# Patient Record
Sex: MALE | Race: WHITE
[De-identification: names, ages, dates, MRNs, and addresses within clinical notes are randomized per-mention and may not be internally consistent; named-entity substitution may affect disease eponyms.]

---

## 2018-03-22 ENCOUNTER — HOSPITAL ENCOUNTER (OUTPATIENT)
Dept: HOSPITAL 92 - BICMRI | Age: 73
Discharge: HOME | End: 2018-03-22
Attending: NEUROLOGICAL SURGERY
Payer: MEDICARE

## 2018-03-22 DIAGNOSIS — R60.0: ICD-10-CM

## 2018-03-22 DIAGNOSIS — M47.892: ICD-10-CM

## 2018-03-22 DIAGNOSIS — M54.2: Primary | ICD-10-CM

## 2018-03-22 PROCEDURE — 72156 MRI NECK SPINE W/O & W/DYE: CPT

## 2018-04-05 ENCOUNTER — HOSPITAL ENCOUNTER (OUTPATIENT)
Dept: HOSPITAL 92 - RAD | Age: 73
Discharge: HOME | End: 2018-04-05
Attending: ANESTHESIOLOGY
Payer: MEDICARE

## 2018-04-05 DIAGNOSIS — S13.150A: ICD-10-CM

## 2018-04-05 DIAGNOSIS — M47.812: Primary | ICD-10-CM

## 2018-04-05 PROCEDURE — 72050 X-RAY EXAM NECK SPINE 4/5VWS: CPT

## 2018-10-01 ENCOUNTER — HOSPITAL ENCOUNTER (INPATIENT)
Dept: HOSPITAL 92 - ERS | Age: 73
LOS: 6 days | Discharge: HOME | DRG: 189 | End: 2018-10-07
Attending: FAMILY MEDICINE | Admitting: FAMILY MEDICINE
Payer: MEDICARE

## 2018-10-01 VITALS — BODY MASS INDEX: 25.9 KG/M2

## 2018-10-01 DIAGNOSIS — J96.01: Primary | ICD-10-CM

## 2018-10-01 DIAGNOSIS — C79.70: ICD-10-CM

## 2018-10-01 DIAGNOSIS — E03.9: ICD-10-CM

## 2018-10-01 DIAGNOSIS — R65.10: ICD-10-CM

## 2018-10-01 DIAGNOSIS — R64: ICD-10-CM

## 2018-10-01 DIAGNOSIS — F41.9: ICD-10-CM

## 2018-10-01 DIAGNOSIS — C34.90: ICD-10-CM

## 2018-10-01 DIAGNOSIS — E83.39: ICD-10-CM

## 2018-10-01 DIAGNOSIS — C79.51: ICD-10-CM

## 2018-10-01 DIAGNOSIS — E78.5: ICD-10-CM

## 2018-10-01 DIAGNOSIS — C77.9: ICD-10-CM

## 2018-10-01 DIAGNOSIS — J38.01: ICD-10-CM

## 2018-10-01 DIAGNOSIS — R13.12: ICD-10-CM

## 2018-10-01 DIAGNOSIS — Z91.81: ICD-10-CM

## 2018-10-01 DIAGNOSIS — J44.1: ICD-10-CM

## 2018-10-01 DIAGNOSIS — Z85.818: ICD-10-CM

## 2018-10-01 DIAGNOSIS — E44.0: ICD-10-CM

## 2018-10-01 DIAGNOSIS — Z87.891: ICD-10-CM

## 2018-10-01 DIAGNOSIS — C79.89: ICD-10-CM

## 2018-10-01 DIAGNOSIS — I10: ICD-10-CM

## 2018-10-01 DIAGNOSIS — Z92.3: ICD-10-CM

## 2018-10-01 DIAGNOSIS — E87.6: ICD-10-CM

## 2018-10-01 DIAGNOSIS — R59.9: ICD-10-CM

## 2018-10-01 LAB
ALBUMIN SERPL BCG-MCNC: 3.9 G/DL (ref 3.4–4.8)
ALP SERPL-CCNC: 69 U/L (ref 40–150)
ALT SERPL W P-5'-P-CCNC: 64 U/L (ref 8–55)
ANION GAP SERPL CALC-SCNC: 12 MMOL/L (ref 10–20)
APTT PPP: 28.8 SEC (ref 22.9–36.1)
AST SERPL-CCNC: 26 U/L (ref 5–34)
BILIRUB SERPL-MCNC: 1.3 MG/DL (ref 0.2–1.2)
BUN SERPL-MCNC: 15 MG/DL (ref 8.4–25.7)
CALCIUM SERPL-MCNC: 9.6 MG/DL (ref 7.8–10.44)
CHLORIDE SERPL-SCNC: 94 MMOL/L (ref 98–107)
CO2 SERPL-SCNC: 31 MMOL/L (ref 23–31)
CREAT CL PREDICTED SERPL C-G-VRATE: 0 ML/MIN (ref 70–130)
D DIMER PPP FEU-MCNC: 0.29 *MCG/ML (ref 0.27–0.43)
GLOBULIN SER CALC-MCNC: 2.8 G/DL (ref 2.4–3.5)
GLUCOSE SERPL-MCNC: 114 MG/DL (ref 83–110)
HGB BLD-MCNC: 15.6 G/DL (ref 14–18)
INR PPP: 0.9
MCH RBC QN AUTO: 31.7 PG (ref 27–31)
MCV RBC AUTO: 101 FL (ref 78–98)
MDIFF COMPLETE?: YES
PLATELET # BLD AUTO: 374 THOU/UL (ref 130–400)
PLATELET BLD QL SMEAR: (no result)
POTASSIUM SERPL-SCNC: 3.6 MMOL/L (ref 3.5–5.1)
PROTHROMBIN TIME: 12 SEC (ref 12–14.7)
RBC # BLD AUTO: 4.92 MILL/UL (ref 4.7–6.1)
SODIUM SERPL-SCNC: 133 MMOL/L (ref 136–145)
SP GR UR STRIP: 1.04 (ref 1–1.04)
WBC # BLD AUTO: 17.1 THOU/UL (ref 4.8–10.8)

## 2018-10-01 PROCEDURE — 85730 THROMBOPLASTIN TIME PARTIAL: CPT

## 2018-10-01 PROCEDURE — 83880 ASSAY OF NATRIURETIC PEPTIDE: CPT

## 2018-10-01 PROCEDURE — 83605 ASSAY OF LACTIC ACID: CPT

## 2018-10-01 PROCEDURE — 96377 APPLICATON ON-BODY INJECTOR: CPT

## 2018-10-01 PROCEDURE — 94664 DEMO&/EVAL PT USE INHALER: CPT

## 2018-10-01 PROCEDURE — 84100 ASSAY OF PHOSPHORUS: CPT

## 2018-10-01 PROCEDURE — 93005 ELECTROCARDIOGRAM TRACING: CPT

## 2018-10-01 PROCEDURE — 84145 PROCALCITONIN (PCT): CPT

## 2018-10-01 PROCEDURE — 94640 AIRWAY INHALATION TREATMENT: CPT

## 2018-10-01 PROCEDURE — 70553 MRI BRAIN STEM W/O & W/DYE: CPT

## 2018-10-01 PROCEDURE — 85610 PROTHROMBIN TIME: CPT

## 2018-10-01 PROCEDURE — 96361 HYDRATE IV INFUSION ADD-ON: CPT

## 2018-10-01 PROCEDURE — C1788 PORT, INDWELLING, IMP: HCPCS

## 2018-10-01 PROCEDURE — 71045 X-RAY EXAM CHEST 1 VIEW: CPT

## 2018-10-01 PROCEDURE — 96365 THER/PROPH/DIAG IV INF INIT: CPT

## 2018-10-01 PROCEDURE — 3E03305 INTRODUCTION OF OTHER ANTINEOPLASTIC INTO PERIPHERAL VEIN, PERCUTANEOUS APPROACH: ICD-10-PCS

## 2018-10-01 PROCEDURE — 80048 BASIC METABOLIC PNL TOTAL CA: CPT

## 2018-10-01 PROCEDURE — 87040 BLOOD CULTURE FOR BACTERIA: CPT

## 2018-10-01 PROCEDURE — 80053 COMPREHEN METABOLIC PANEL: CPT

## 2018-10-01 PROCEDURE — 85025 COMPLETE CBC W/AUTO DIFF WBC: CPT

## 2018-10-01 PROCEDURE — S0028 INJECTION, FAMOTIDINE, 20 MG: HCPCS

## 2018-10-01 PROCEDURE — 81003 URINALYSIS AUTO W/O SCOPE: CPT

## 2018-10-01 PROCEDURE — 85379 FIBRIN DEGRADATION QUANT: CPT

## 2018-10-01 PROCEDURE — 71275 CT ANGIOGRAPHY CHEST: CPT

## 2018-10-01 PROCEDURE — 36415 COLL VENOUS BLD VENIPUNCTURE: CPT

## 2018-10-01 PROCEDURE — 83735 ASSAY OF MAGNESIUM: CPT

## 2018-10-01 PROCEDURE — 87086 URINE CULTURE/COLONY COUNT: CPT

## 2018-10-01 PROCEDURE — A4216 STERILE WATER/SALINE, 10 ML: HCPCS

## 2018-10-01 PROCEDURE — 94760 N-INVAS EAR/PLS OXIMETRY 1: CPT

## 2018-10-01 PROCEDURE — 80202 ASSAY OF VANCOMYCIN: CPT

## 2018-10-01 RX ADMIN — VANCOMYCIN HYDROCHLORIDE SCH MLS: 1 INJECTION, POWDER, LYOPHILIZED, FOR SOLUTION INTRAVENOUS at 23:04

## 2018-10-01 RX ADMIN — MOMETASONE FUROATE AND FORMOTEROL FUMARATE DIHYDRATE SCH PUFF: 200; 5 AEROSOL RESPIRATORY (INHALATION) at 20:08

## 2018-10-01 RX ADMIN — FAMOTIDINE SCH MG: 10 INJECTION, SOLUTION INTRAVENOUS at 20:09

## 2018-10-01 NOTE — RAD
ONE VIEW CHEST:

 

History: Dyspnea. Recent diagnosis of lung cancer. 

 

FINDINGS: 

Atherosclerosis of the aorta. Normal cardiac silhouette. The pulmonary vessels and hilum are normal. 
Costophrenic angles are clear. Lungs are hyperinflated. Interstitial opacities of the lung bases are 
noted. There appears to be a nodule in the left upper lobe, measuring 1.3 cm. 

 

There is bilateral apical pleural thickening. No pneumothorax. 

 

IMPRESSION: 

1. Hyperinflation. 

2. Chronic change of the lung parenchyma. 

3. Left upper lobe nodule.

 

POS: [facility]

## 2018-10-01 NOTE — CT
CTA OF THORAX UTILIZING IV CONTRAST AND 3D REFORMATTED IMAGING:

 

Date:  10/01/18 

 

INDICATION:

Shortness of breath with a history of cancer. Patient has a history of lung cancer diagnosed 2 weeks 
ago with 30 lb. weight loss, and increasing weakness and falls. 

 

TECHNIQUE:  

Multiple CTA images were obtained of the thorax utilizing PE protocol. 3D reformatted images were con
structed from the raw data. Motion artifact slightly limits image detail. 

 

FINDINGS:

There is air space consolidation within both lower lobes, with debris seen within the bronchi of both
 lower lobes suspicious for aspiration. There are patchy areas of ground-glass and reticulonodular op
acity within the lateral segment of the right middle lobe suspicious for aspiration. 

 

There is a peripheral nodular opacity within the right upper lobe measuring 8.0 mm on image 51 of ser
ies 3. There is a 2.5 cm pulmonary nodule within the apical posterior segment of the left upper lobe 
on image 33 of series 3. There is pleural parenchymal scarring and scattered emphysema involving both
 lungs. There is a small, 5.0 mm, pulmonary nodule of right upper lobe apical segment on image 22 of 
series 3. Smaller, sub 4.0 mm, pulmonary nodules within the anterior segment of the right upper lobe,
 image 44 of series 3. 

 

There is an enlarged lymphnode within the left AP window measuring 4.5 cm, image 45, series 2. There 
is a left suprahilar nodular prominence on image 49 of series 2 measuring 1.9 cm, which may reflect a
 suprahilar lymph node versus suprahilar mass. 

 

There are enlarged lymph nodes within the left axillary region, the largest measuring 2.4 cm on image
 38 of series 2. 

 

There is ectasia of the ascending aorta up to 3.9 cm. There is aneurysmal dilatation of the aortic ar
ch measuring 3.1 cm. The descending thoracic aorta also measures 3.2 cm. There are scattered coronary
 artery and thoracic aortic calcifications. 

 

Motion artifact slightly limits image detail. No definite central pulmonary embolus is evident. The s
egmental pulmonary arteries are not well seen. There is a mass within the left adrenal gland measurin
g 3.7 cm. 

 

No definite destructive osteolytic or osteoblastic lesion is identified. 

 

IMPRESSION: 

1.  No definite central pulmonary embolus demonstrated within limitations of exam. Lobar and segmenta
l pulmonary branches are not well seen due to motion artifact. 

 

2.  Findings suspicious for aspiration pneumonitis of both lower lobes and right middle lobe. 

 

3.  Scattered pulmonary nodules within both lungs suspicious for metastatic disease. 

 

4.  Nodular opacity within the left suprahilar region may reflect an enlarged lymph node versus supra
hilar mass. 

 

5.  Large lymph node within the AP window of the mediastinum suspicious for malignant lymphadenopathy
. 

 

6.  Enlarged lymph nodes left axillary region suspicious for metastatic disease. 

 

7.  Left adrenal gland mass suspicious for metastatic disease. 

 

 

 

 

 

POS: [facility]

## 2018-10-01 NOTE — PDOC.FPRHP
- History of Present Illness


Chief Complaint: Worsening SOB


History of Present Illness: 





This is a 74 yo male PMH of Hypothyroidism 2/2 radiation for christiano/throat cancer

, HLD, recent diagnosis of Lung cancer (2wk ago), HTN who presents to the ED 

with a CC of worsening SOB. He and his wife report he has been havin increased 

dyspea in the last 2 weeks. He reports a productive cough with thick yellow 

sputum. Pt. reports that in the last 6 weeks, he as been on 3 different abx 

regiments. One was levoquin and he had a negative reaction to it. Wife is 

unsure of the other two. Pt was also started a steroid step down on 9/19-10/4. 

He is currently on 1/2 pill, unknown dose. Pt. has no history of COPD or 

history of dyspnea. 





Pt. has been having dysphagia x 1 month. He is having trouble swallowing water. 

He reports it gets stuck up in his throat. 





Pt. had fall on 2/18





Pt. was diagnosed with Throat and tongue cancer in 2003. He received Radiation 

and surgery. Radiation therapy caused iatrogenic hypothyroidism and cataracts. 

Pt. had scope by Dr. Hatfield on 9/18 and he was clear of any cancer. 





Pt. had PET scan and was diagnosed with lung cancer 2 weeks ago. 9/28 Pt. had 

axilarly lymph node biopsy. Pending results. Dr. Yuen is his pulmonologist. 





- Allergies/Adverse Reactions


 Allergies











Allergy/AdvReac Type Severity Reaction Status Date / Time


 


No Known Allergies Allergy   Unverified 10/01/18 14:40














- Home Medications


 











 Medication  Instructions  Recorded  Confirmed  Type


 


Amlodipine [Norvasc] 10 mg PO DAILY 10/01/18 10/01/18 History


 


Levothyroxine Sodium [Synthroid] 137 mcg PO DAILY 10/01/18 10/01/18 History


 


Losartan/Hydrochlorothiazide 1 tablet PO DAILY 10/01/18 10/01/18 History





[Losartan-Hctz 100-12.5 mg Tab]    














- History


PMHx: Hypothyroidism, HLD, metastatic lung cancer, throat/tongue cancer in 

remission, HTN


 


PSHx: Throat/tongue cancer, 





FHx: none


 


Social: Denies D/A/T


 








- Review of Systems


General: reports: weight/appetite/sleep changes (Decreased oral intake).  denies

: fever/chills


Eyes: denies: eye pain, vision changes


ENT: denies: nasal congestion, rhinorrhea


Respiratory: reports: cough (yellow sputum), shortness of breath, exercise 

intolerance.  denies: congestion


Cardiovascular: reports: other (rib pain from coughing).  denies: chest pain, 

palpitation, edema


Gastrointestinal: denies: nausea, vomiting, diarrhea, constipation, abdominal 

pain


Genitourinary: denies: incontinence, dysuria


Skin: denies: rashes, lesions


Musculoskeletal: denies: pain, tenderness


Neurological: denies: numbness, syncope





- Vital signs


BP: 145/95  HR: 103 RR: 24 Tmax: 97.2 Pox: 93% on 4L  Wt: 77.11   








- Physical Exam


Constitutional: awake, alert and oriented


-Constitutional: 





Appears in mild distress 2/2 respiratory distress.


HEENT: normocephalic and atraumatic, PERRLA, EOMI, conjunctiva clear


Neck: supple, FROM


Chest: no-tender to palpation


Heart: RRR, normal S1/S2, no murmurs/rubs/gallops


-Lungs: 





Pt. has diffuse coarse breath sounds. Pt. also has radiation from upper airway


Abdomen: soft, non-tender, bowel sounds present


Musculoskeletal: ROM grossly normal


Neurological: CN II-XII intact


-Skin: 





Cap refill ~4 seconds


Psychiatric: normal mood and affect, good judgment and insight





FMR H&P: Results





- Labs


Result Diagrams: 


 10/02/18 04:23





 10/02/18 04:23


Lab results: 


 











WBC  17.1 thou/uL (4.8-10.8)  H  10/01/18  10:32    


 


Hgb  15.6 g/dL (14.0-18.0)   10/01/18  10:32    


 


Hct  49.8 % (42.0-52.0)   10/01/18  10:32    


 


MCV  101.0 fL (78.0-98.0)  H  10/01/18  10:32    


 


Plt Count  374 thou/uL (130-400)   10/01/18  10:32    


 


Band Neuts % (Manual)  25 % (5-11)  H  10/01/18  10:32    


 


Sodium  133 mmol/L (136-145)  L  10/01/18  10:32    


 


Potassium  3.6 mmol/L (3.5-5.1)   10/01/18  10:32    


 


Chloride  94 mmol/L ()  L  10/01/18  10:32    


 


Carbon Dioxide  31 mmol/L (23-31)   10/01/18  10:32    


 


BUN  15 mg/dL (8.4-25.7)   10/01/18  10:32    


 


Creatinine  0.75 mg/dL (0.6-1.3)   10/01/18  10:32    


 


Glucose  114 mg/dL ()  H  10/01/18  10:32    


 


Lactic Acid  1.5 mmol/L (0.5-2.2)   10/01/18  10:32    


 


Calcium  9.6 mg/dL (7.8-10.44)   10/01/18  10:32    


 


Total Bilirubin  1.3 mg/dL (0.2-1.2)  H  10/01/18  10:32    


 


AST  26 U/L (5-34)   10/01/18  10:32    


 


ALT  64 U/L (8-55)  H  10/01/18  10:32    


 


Alkaline Phosphatase  69 U/L ()   10/01/18  10:32    


 


Serum Total Protein  6.7 g/dL (5.8-8.1)   10/01/18  10:32    


 


Albumin  3.9 g/dL (3.4-4.8)   10/01/18  10:32    














- Radiology Interpretation


  ** Chest x-ray


Status: image reviewed by me, report reviewed by me (Hyperinflation, chronic 

change of the lung parenchyma, left upper lobe nodule)





FMR H&P: A/P





- Problem List


(1) Dyspnea


Current Visit: Yes   Status: Acute   Code(s): R06.00 - DYSPNEA, UNSPECIFIED   





(2) Lung cancer


Current Visit: Yes   Status: Acute   Code(s): C34.90 - MALIGNANT NEOPLASM OF 

UNSP PART OF UNSP BRONCHUS OR LUNG   





(3) HTN (hypertension)


Current Visit: Yes   Status: Acute   Code(s): I10 - ESSENTIAL (PRIMARY) 

HYPERTENSION   





(4) HLD (hyperlipidemia)


Current Visit: Yes   Status: Acute   Code(s): E78.5 - HYPERLIPIDEMIA, 

UNSPECIFIED   





(5) Hypothyroidism


Current Visit: Yes   Status: Acute   Code(s): E03.9 - HYPOTHYROIDISM, 

UNSPECIFIED   





(6) History of throat cancer


Current Visit: Yes   Status: Acute   Code(s): Z85.819 - PRSNL HX OF MALIG 

NEOPLM OF UNSP SITE LIP,ORAL CAV,& PHARYNX   





(7) Tongue cancer


Current Visit: Yes   Status: Acute   





(8) History of tobacco abuse


Current Visit: Yes   Status: Acute   Code(s): Z87.891 - PERSONAL HISTORY OF 

NICOTINE DEPENDENCE   





- Plan





This is a 74 yo male PMH of Hypothyroidism 2/2 radiation for tongue/throat 

cancer, HLD, recent diagnosis of Lung cancer (2wk ago), HTN








Dyspnea PNA vs. COPD exacerbation


-CXR shows no signs of PNA, procalcitonin 31.79. Pt. was started on vanc and 

zosyn (10/1) in the ED and will be continued. He is also receiving PRN duonebs 

and PRN albuterol nebs. Pt has been started on 10mg dexamethasone (10/1). D-

dimer was negative. CBC was 17.1 although patient has been on PO steroids for 

dyspnea since 9/19.





Lung cancer


-Diagnoses with PET scan 2 weeks ago. Pt. had a lymph node biopsy on 9/28. Dr. Yuen is his pulmonologist. 





Hypothyroidism


-2/2 radiation. Will continue home meds once reconciled.





HTN


-Continue pt's home amlodipine and losartan/HCTZ





HLD


-Start pt. on statin





Hx of throat/tongue cancer


-Pt. has been on pureed diet since surgery in 2003. Pt. had recent, 9/18, scope 

performed by Dr. Rodrick Hatfield showing no signs of cancer. Despite this he as 

had increased dysphagia to water only. Speech has been consulted. Pt. is on 

clear liquid diet.





Hx of fall


-No current risk of fall. Was 2/2 poor judgment





Hx of tobacco abuse


-30 pack year history, quit 10 years ago











Code: full


Prophylaxis: Pepcid, lovenox


Family: Wife at bedside, plan discussed with her.


Disposition: Admit to oncology, [place] in 3-4 days





FMR H&P: Upper Level





- Pertinent history





HPI: 74 yo M with a PMHx HTN and head and neck cancer who presents with 

worsening shortness of breath, dysphagia and chest congestion for the last 3 

weeks. He reports cough with increased sputum production and that he has been 

on multiple treatment regimens of abx and steroids OP without any improvement. 

He reports that his BP has also been increasingly difficult to control over the 

last few weeks as well. He has had poor PO intake and difficulty swallowing his 

pureed diet and now some difficulty with water. His PCP is in [place]. 








- Pertinent findings





Exam: VS as above - mildly hypertensive, tachycardic and tachypenic


Gen: alert and oriented x3


HEENT: EOMI, conjunctiva non-injected, oropharynx without posterior erythema or 

exudates, tongue resected


CV: tachycardic, distant heart sounds hard to auscultate over breath sounds


RESP: High-pitched diffuse coarse worse in upper lobes with good air entry BL


Abd: soft, nontender, nondistended


Ext: BLE without edema, pedal pulses 1+, radial pulses 1+


Neuro: No facial droop, equal movements in all extremities, speech slightly 

difficult to understand


Skin: bruising under L axilla at site of LN biopsy





CXR: Increased lung fields, L lung nodule, chronic changes





Labs: WBC 17.1,  Hgb 15.6, hct 49.8, Plt 374, , Coags WNL, D dimber 0.29

, Na 133, K 3.6, CO2 94, Cr 075, Glu 114, AST 26, ALT 64, Alk phos 69, BNP 29.8

, Procal 31.79





A/P: 74 yo M with PMHx HTN and head and neck cancer with recent worsening SOB 

and dysphagia here with SIRS 2/2 suspected PNA


1. SIRS: s/p 1L NS in ED. Will continue fluids. Vanc and Zosyn. BCx not drawn 

until after abs started. Lactic acid negative. Procal elevated at 31.79.


2. SOB: No clear consolidation on CXR. CTA pending. Satting 93% on 4L NC. 

Reports SOB was 5/10 on admission and now 2.5 in difficulty. Will start steroids

, continue abx and schedule duonebs.


3. Dysphagia: Acutely worsening over the last couple of weeks. SLP consult


4. HTN:  Slightly elevated. Start home meds. Amlodipine and Losartan/hctz. 

Consider augmentation.


5. Recent LN biopsy with likely metastatic disease: Will await path. No 

oncologist as of yet. Will consult if indicated inpatient, otherwise will need 

close f/u outpatient.


6. Hypothyroid: Continue home Synthroid


7. H/o head and neck cancer


8. H/o tobacco abuse: Quit 10 years ago





PPX: Lovenox





CODE: FULL








- Plan


Date/Time: 10/01/18 1202








I, Naomi Min MD, PGY-3, have evaluated this patient and agree with findings/

plan as outlined by intern resident. Pertinent changes/additions are listed 

here.








Attending Addendum





- Attending Addendum


Date/Time: 10/01/18 1352





I personally evaluated the patient and discussed the management with Dr. Thorne and Dr. Min


I agree with the History, Examination, Assessment and Plan documented above 

with any addition or exceptions noted below.





74 yo male with past history of head and neck cancer presents to ER for 

evaluation for SOB.


Patient reports recent dx of primary lung cancer with possible mets to 

adrenals. Reports chronic worsening cough with increased sputum production over 

the past month. Has been treated with 3 rounds of antibiotics and steroid 

taper. Today not able to ambulate easily at home due to SILVA which is far from 

baseline. s/p lymph node biopsy on Friday. No fever or chills. No known sick 

contacts. No other symptoms. 


VS reviewed. 


Images reviewed. 


Labs reviewed. 


Ill appearing on exam. Tachycardic on exam. No murmurs. Course breath sounds 

throughout felt to be complicated by radiation of upper airway noise. 





SIRS concern for sepsis: Procal 31. Cultures obtained after antibiotics given 

in ER. Emperic antibiotics for now. Continue IVFs. Monitor closely. (HR, RR, 

WBCs, procal)


Hypoxic respiratory distress: On supplemental O2. O2 sat maintained on NC. 

Patient reports feeling much better. CXR reviewed. CTA abd ddimer negative for 

PE. Also patient has 2 medical records in system with previous CTAs noted just 

recently. Not known during time of CTA order. Working on merging charts. 


Head/Neck cancer: s/p treatment/surgery 2003. Now with dysphagia. Speech eval 

for swallow. 


Metastatic lung cancer: Awaiting complete diagnosis and workup. Yuen to be 

consulted. 


COPD: Schedule breathing treatments and steroids. Adjust meds as needed. 





José

## 2018-10-02 LAB
ANION GAP SERPL CALC-SCNC: 11 MMOL/L (ref 10–20)
BASOPHILS # BLD AUTO: 0 THOU/UL (ref 0–0.2)
BASOPHILS NFR BLD AUTO: 0 % (ref 0–1)
BUN SERPL-MCNC: 11 MG/DL (ref 8.4–25.7)
CALCIUM SERPL-MCNC: 9 MG/DL (ref 7.8–10.44)
CHLORIDE SERPL-SCNC: 97 MMOL/L (ref 98–107)
CO2 SERPL-SCNC: 31 MMOL/L (ref 23–31)
CREAT CL PREDICTED SERPL C-G-VRATE: 91 ML/MIN (ref 70–130)
EOSINOPHIL # BLD AUTO: 0 THOU/UL (ref 0–0.7)
EOSINOPHIL NFR BLD AUTO: 0.2 % (ref 0–10)
GLUCOSE SERPL-MCNC: 137 MG/DL (ref 83–110)
HGB BLD-MCNC: 14.1 G/DL (ref 14–18)
LYMPHOCYTES # BLD: 0.5 THOU/UL (ref 1.2–3.4)
LYMPHOCYTES NFR BLD AUTO: 3.9 % (ref 21–51)
MCH RBC QN AUTO: 31.3 PG (ref 27–31)
MCV RBC AUTO: 103 FL (ref 78–98)
MONOCYTES # BLD AUTO: 0.2 THOU/UL (ref 0.11–0.59)
MONOCYTES NFR BLD AUTO: 1.9 % (ref 0–10)
NEUTROPHILS # BLD AUTO: 11.1 THOU/UL (ref 1.4–6.5)
NEUTROPHILS NFR BLD AUTO: 94 % (ref 42–75)
PLATELET # BLD AUTO: 318 THOU/UL (ref 130–400)
POTASSIUM SERPL-SCNC: 3.1 MMOL/L (ref 3.5–5.1)
RBC # BLD AUTO: 4.49 MILL/UL (ref 4.7–6.1)
SODIUM SERPL-SCNC: 136 MMOL/L (ref 136–145)
WBC # BLD AUTO: 11.8 THOU/UL (ref 4.8–10.8)

## 2018-10-02 PROCEDURE — 0DH63UZ INSERTION OF FEEDING DEVICE INTO STOMACH, PERCUTANEOUS APPROACH: ICD-10-PCS

## 2018-10-02 RX ADMIN — VANCOMYCIN HYDROCHLORIDE SCH MLS: 1 INJECTION, POWDER, LYOPHILIZED, FOR SOLUTION INTRAVENOUS at 16:20

## 2018-10-02 RX ADMIN — MOMETASONE FUROATE AND FORMOTEROL FUMARATE DIHYDRATE SCH PUFF: 200; 5 AEROSOL RESPIRATORY (INHALATION) at 06:02

## 2018-10-02 RX ADMIN — MOMETASONE FUROATE AND FORMOTEROL FUMARATE DIHYDRATE SCH PUFF: 200; 5 AEROSOL RESPIRATORY (INHALATION) at 19:40

## 2018-10-02 RX ADMIN — FAMOTIDINE SCH MG: 10 INJECTION, SOLUTION INTRAVENOUS at 09:42

## 2018-10-02 RX ADMIN — FAMOTIDINE SCH MG: 10 INJECTION, SOLUTION INTRAVENOUS at 20:51

## 2018-10-02 RX ADMIN — POTASSIUM CHLORIDE SCH MLS: 14.9 INJECTION, SOLUTION INTRAVENOUS at 22:24

## 2018-10-02 NOTE — PQF
Date:        10-4-18                                                           
                  ATTN:    DR. YANELI BRAMBILA



Please exercise your independent, professional judgment in responding to the 
clarification form. 

Clinical indicators are provided on the bottom of this form for your review



Please check appropriate box(s):

[ x] Protein Calorie Malnutrition:    [  ] Mild      [ x ] Moderate   [  ] 
Severe   2/2 worsening dysphagia

[  ] Other Malnutrition (please specify) _______________________________________
__

[  ] Other diagnosis ___________

[  ] Unable to determine



In addition, please specify:

Present on Admission (POA):  [ x ] Yes             [  ] No             [  ] 
Unable to determine



CLINICAL INDICATORS - SIGNS / SYMPTOMS / LABS



BMI of   19.3



DIETICIAN CONSULT 10-2-18:  

 MD notes indicate 1 month of progressive dysphagia, unable to swallow

 water now. Recent diagnosis of lung cancer. 40# wt loss noted, in unspecified 
amount of time. 

 Notes also indicate that he has been on puree textures since 2003. 



CONSULT NOTE DR. INIGUEZ 10-2-18:    

  HE APPEARED TO BE CACHECTIC,MALNOURISHED.  HE HAS LOST

  CONSIDERABLE WEIGHT.  THE PATIENT WANTS TO HAVE PEG PLACED.  HE IS UNABLE TO

  GET NUTRITION AS PER HIS WIFE.



CONSULT DR. RAINEY 10-2-18:   

 STARTED DEVELOPING DYSPHAGIA FOR BOTH SOLIDS AND LIQUIDS AND

 HE LOST APPROX 40 #,  OROPHARYNGEAL DYSPHAGIA, WEIGHT LOSS, HX OF THROAT CANCER



RISK FACTORS:  

DIETICIAN CONSULT 10-1-18:  

  hypothyroidism secondary to radiation for tongue/throat cancer, 

  HTN, hyperlipidemia, lung cancer diagnosed 2 weeks ago, minimal alcohol use, 
former tobacco use,  

  cancer with progressive dysphagia



 ER:   CHOKING SENSATION,  INCREASED WEAKNESS,  FREQ FALLS,  HX OF SMOKING



TREATMENT:   

DIETICIAN CONSULT 10-2-18:   

 1) Continue current NPO status.

 2) Once PEG tube is placed, recommend initiating continuous TF of Jevity 1.5. 
Initiate TF at 30 ml/hr 

     and increase by 15 ml every 4 hours to reach goal rate of 75 ml/hr. 
Provide TF over 21 hours/day, 

     hold for Synthroid.

 3) Recommend d/c IVF to prevent over hydration                                
                        

 4) Provide 30 ml flushes every 4 hours while IVF is running and provide 100 ml 
flushes every 4 hours once IVF is d/c'ed.

 5) Monitor and replace electrolytes PRN.

 6) Provide bowel regimen PRN.



Moderate Malnutrition (in acute illness)

Energy Intake: <75% of estimated energy requirement for > 7 days

Weight Loss:  1-2%/1 week;  5%/ 1 month; 7.5%/3 months

Other: mild body fat loss; mild muscle mass loss; mild fluid accumulation; 



Severe Malnutrition (in acute illness)

Energy Intake: < 50% of estimated energy requirement for > 5 days

Weight Loss: >1-2%/1 week; >5%/1 month; >7.5%/3 months

Other: moderate body fat loss; moderate muscle mass loss; moderate- severe 
fluid accumulation; measurably reduced  strength



Moderate Malnutrition (in chronic illness)

Energy Intake: <75% of estimated energy requirement for >1 month

Weight Loss: 5%/1 month; 7.5%/3 months; 10%/6 months; 20%/1 year

Other: mild body fat loss; mild muscle mass loss; mild fluid accumulation



Severe Malnutrition (in chronic illness)

Energy Intake: <75% of estimated energy requirement for >1 month

Weight Loss: >5%/1 month; >7.5%/3 months; >10%/6 months; >20%/1 year

Other: severe body fat loss; severe muscle mass loss; severe fluid accumulation
; measurably reduced  strength



Thank you,

Lita



(This form is maintained as a part of the permanent medical record)

 2015 [employer].  All Rights Reserved

CHRISTOFER [email or web address]    Office:  [phone]

                                                              

North Shore University HospitalADRIANNA

## 2018-10-02 NOTE — PRG
DATE OF SERVICE:  10/02/2018

 

SUBJECTIVE:  This morning, appears to be in no distress.

 

I spoke to his wife.  Speech came to see him and has recommended a modified diet.  She still thinks h
e is not getting enough calories.  She is interested in having a PEG placed.

 

OBJECTIVE:

VITAL SIGNS:  Sats are 99 on 2 liters, respirations 20, temperature 97, pulse 98, blood pressure 169/
88.

CHEST:  Decreased breath sounds, no wheezing.  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
 vocal cord.

CARDIAC:  Normal S1 and S2.  No gallops.

ABDOMEN:  Soft, no masses.

 

LABORATORY DATA:  White count 11,000.  H and H is 14 and 43.  Electrolytes are normal.

 

IMPRESSION:  Metastatic bronchogenic carcinoma.

 

PLAN:

1.  Await final path.

2.  Await input from Oncology.  Probably needs a PEG.  Comfort care.

 

We will follow.

## 2018-10-02 NOTE — PRG
DATE OF SERVICE:  10/02/2018

 

This is an addendum to the note of Dr. Tal Thorne.  

 

Mr. Paz is an 73-year-old man recently found to have lung cancer.  He has also had poor nutriti
on for at least the last several months and Dr. Nikko Shahid has been consulted to place a feeding tube
.  The patient exhibits some mild respiratory distress and stridor, but is otherwise awake and alert.
  

 

The patient has also been seen in consultation by Dr. Yuen who originally saw the patient with lung c
ancer.  He will be following the patient with us as well.  He has spoken to Oncology who is also on b
oard with this patient.

## 2018-10-02 NOTE — CON
DATE OF CONSULTATION:  10/02/2018

 

HISTORY OF PRESENT ILLNESS:  Patient is a 73-year-old  male with a history of tongue cancer 
diagnosed in 2003 and treated with radiation and surgery.  He had swallowing trouble for approximatel
y a year after that and then was able to swallow and is eating on his own up until a few weeks ago, jaquelin ellison started developing dysphagia for both solids and liquids and he lost approximately 40 pounds.  He a
lso was recently diagnosed with a metastatic lung cancer.  He had a PEG tube approximately 1 year aft
er diagnosis of his tongue cancer.

 

PAST MEDICAL HISTORY:  Includes hypothyroidism and hypertension.

 

MEDICATIONS:  Includes Norvasc 10 mg p.o. daily, losartan/hydrochlorothiazide 1 p.o. daily, Synthroid
 137 mcg p.o. daily.

 

ALLERGIES:  No known allergies.

 

SOCIAL HISTORY:  Alcohol is minimal.  Former smoker.

 

PAST SURGICAL HISTORY:  Surgical resection for tongue cancer.

 

REVIEW OF SYSTEMS:  Constitutional:  Positive for weight loss.  Negative for fever or chills.  Eyes: 
 No blurred vision, double vision.  ENT:  Positive for sore throat.  Negative for earache.

Pulmonary:  Positive for shortness of breath, positive for cough, positive for wheezing.

Cardiovascular:  Negative for chest pain.  Negative for palpitations.  Gastrointestinal:  See above.

Genitourinary:  No hematuria or dysuria.  Musculoskeletal:  Negative for joint pain or muscle weaknes
s.  Skin:  No rashes.  Neurologic:  No numbness or seizure activity.

 

PHYSICAL EXAMINATION:

VITAL SIGNS:  Temperature 97.4, pulse 90, respiratory rate 20, blood pressure 169/88.

HEENT:  Significant for radiation changes and surgical changes to the mouth.

NECK:  Stiff with limited range of motion, also radiation changes to neck.

CHEST:  Clear.

CARDIOVASCULAR:  Regular rate and rhythm.

ABDOMEN:  Soft, nontender with a previous PEG site in the left upper quadrant.

RECTAL:  Deferred.

EXTREMITIES:  Normal.

NEUROLOGIC:  Nonfocal.

 

LABORATORY DATA AND IMAGING:  Shows a white blood cell count of 11.8, hemoglobin 14.1, MCV of 103.  P
T is 12.0, INR is 0.9.  Chemistries show potassium 3.1, glucose 137.  CT of the chest and thorax show
 finding suspicious for aspiration pneumonitis, pulmonary nodules, suprahilar mass, malignant lymphad
enopathy, metastasis to the left adrenal gland.

 

ASSESSMENT:

1.  Oropharyngeal dysphagia - multifactorial.

2.  Weight loss.

3.  Aspiration pneumonitis.

4.  Metastatic lung cancer.

5.  History of tongue cancer.

 

RECOMMENDATIONS:  EGD and PEG.

## 2018-10-02 NOTE — CON
A 73-year-old gentleman who is well known to me.  He was seen in the office on 
09/19/2018 where a CT of his chest shows very extensive multiple lung nodules, 
the largest one being pleural based left upper lung 2.5 cm.  Additionally, he 
had adrenal mass in the left side 5 x 3 cm and aorta pulmonary mass with some 
minimal distal tracheal extrinsic compression.  He lives in [facility] in 2003 in 
[place].  He was diagnosed to have lung cancer and underwent radical surgery 
with head and neck cancer surgery with reconstruction surgery, skin taken from 
the left forearm.

 

He did well.  He had a PET scan done, some 5 years ago, which was negative.  At 
the time when I saw him, he appeared to be cachectic, malnourished.  He has 
lost considerable weight.  He is going to see Dr. Hatfield whom he has seen 
before in the past.  I ordered another PET scan on him, which then showed 
rather extensive metastatic disease along with extensive adenopathy and a large 
left axillary lymph node.  This was biopsied on Friday.  I spoke to the 
pathologist today, what appears to be poorly differentiated carcinoma, still 
doing some stains.

 

Patient is a nonsmoker since 2003.  Prior to that smoked 1.5 packs for 30 years 
with a previous history of TB or pneumonia.

 

Presently can barely walk even across the green following which he is markedly 
tired.

 

PAST MEDICAL HISTORY:  Pertinent for hypothyroidism, hypertension.

 

MEDICATIONS:  Amlodipine 10 mg, levothyroxine, 137, losartan and aspirin.

 

PAST SURGICAL HISTORY:  Cancer in 2003 in [place] for the radiation.

 

SOCIAL HISTORY:  Alcohol minimal.  Tobacco as noted.  , 
used roundup_.

 

REVIEW OF SYSTEMS:  Otherwise, unremarkable.

 

PHYSICAL EXAMINATION:

GENERAL:  On examination is cachectic.  He has some difficulty swallowing.  
Temperature is 97, pulse 100, respiration 24, saturations are 93 and 5 liters, 
blood pressure 170/99.

CHEST:  Bilateral rhonchi and crackles.

CARDIAC:  Normal S1, S2.

ABDOMEN:  Soft, no masses.

 

LABORATORY:  White count 17,000, H&H 15 and 49, platelet count 375.  His 
electrolytes are normal.  Creatinine is 0.75, sodium 133.

 

IMPRESSION:

1.  Metastatic probably primary lung cancer.  Recent PET scan showing extensive 
adenopathy with additional mets involving adrenal left-sided mets.

2.  Marked weight loss.

3.  Dysphagia.

4.  Chronic obstructive pulmonary disease.

 

At this stage, I doubt he has got any pneumonia issues.  Though, I agree with 
empiric antibiotics.  I would deescalate antibiotics as soon as possible.  
Continue nebulizer treatments, steroids.  I spoke with Dr. Horne.  He will be 
seeing the patient.  The patient wants to have a PEG placed.  He is unable to 
get a nutrition as per his wife.  We will make a decision in the next 24 hours.

 

COURTNEY

## 2018-10-02 NOTE — PQF
DATE:      10-2-18                                                             
       ATTN:  DR. YANELI BRAMBILA



Please exercise your independent, professional judgment in responding to the 
clarification form. 

Clinical indicators are provided on the bottom of this form for your review



Please check appropriate box(s):

[ x ] Acute Respiratory Failure:                     [  x] with Hypoxia[  ] 
with Hypercapnia

[  ] Acute Respiratory Failure due to: (etiology) ______________________ 

[  ] Other diagnosis ___________

[  ] Unable to determine



In addition, please specify:

Present on Admission (POA):  [ x ] Yes             [  ] No             [  ] 
Unable to determine



For continuity of documentation, please document condition throughout progress 
notes and discharge summary.  Thank You.



CLINICAL INDICATORS - SIGNS / SYMPTOMS / LABS



ER DX:   SEPSIS, LUNG CANCER, PNEUMONIA



ER:   SOB,  WORSENING SOB AND DIFFICULTY BREATHING SINCE 9-28-18.  PT UNABLE TO 
SPEAK

        IN FULL SENTENCES BUT FAMILY REPORTS THAT PT HAS BEEN UNABLE TO CATCH 
HIS BREATH 

        OR SWALLOW SINCE THIS MORNING.  PT DIAGNOSED WITH LUNG CA 2 WKS AGO,  
FORMER SMOKER, 

        USING ACCESSORY MUSCLES, TACHYPNEIC



RR:   ER:    32,  24,  31,  26



O2 SAT:   ER:   SAT 95 ON 4L O2

                        SAT 93 ON 4L O2



H&P:   HYPOXIC RESPIRATORY DISTRESS,  ACUTE DYSPNEA, PT RECEIVING PRN DUONEBS 
AND ALBUTEROL 

           NEBS, SATTING 93% ON 4L, WILL START STEROIDS, CONTINUE ABX



RISK FACTORS:  ER:  SOB,  WORSENING SOB AND DIFFICULTY BREATHING SINCE 9-28-18.
  PT UNABLE TO SPEAK IN FULL 

                                  SENTENCES BUT FAMILY REPORTS THAT PT HAS BEEN 
UNABLE TO CATCH HIS BREATH OR 

                                  SWALLOW SINCE THIS MORNING.  PT DIAGNOSED 
WITH LUNG CA 2 WKS AGO,  FORMER

                                 SMOKER, USING ACCESSORY MUSCLES, TACHYPNEIC



                            ER DX:   SEPSIS, LUNG CANCER, PNEUMONIA



TREATMENTS:   O2 SAT:   ER:   O2 SAT 95 ON 4L O2



                          O2 SAT 93 ON 4L O2

  

                           H&P:  PT RECEIVING PRN DUONEBS AND ALBUTEROL NEBS, 
SATTING 93% ON 4L, WILL START

                                    STEROIDS, CONTINUE ABX,. HYPOXIC 
RESPIRATORY DISTRESS



                           ER:  ZOSYN, VANCOMYCIN, IVF



(This form is maintained as a part of the permanent medical record)

 2015 [employer].  All Rights Reserved

CHRISTOFER [email or web address]    Office:  [phone]

                                                              

[facility]

## 2018-10-02 NOTE — PDOC.FM
- Subjective


Subjective: 





Pt. states that he slept poorly overnight. He states that the breathing 

treatments help some. He reports wanting to go forward with exploring having a 

PEG tube placed. He report having it placed before. He denies CP or abdominal 

pain. Pt. tried swallowing water in front of me and immediately went to 

coughing.





- Objective


MAR Reviewed: Yes


Vital Signs & Weight: 


 Vital Signs (12 hours)











  Temp Pulse Resp BP BP Pulse Ox


 


 10/02/18 05:22      159/93 H 


 


 10/02/18 05:15  97.8 F  102 H  20   196/105 H  98


 


 10/02/18 05:14   102 H   196/105 H  


 


 10/02/18 01:01   94  18    99


 


 10/01/18 23:25  98.2 F  92  20   166/92 H  98


 


 10/01/18 22:45   93  18    99


 


 10/01/18 22:00   98  20   148/79 H  96


 


 10/01/18 21:05   102 H   189/106 H  


 


 10/01/18 20:08   102 H  18    100


 


 10/01/18 20:06   102 H  18    100


 


 10/01/18 20:00  98.0 F  100  20   189/106 H  100








 Weight











Weight                         68.22 kg














I&O: 


 











 09/30/18 10/01/18 10/02/18





 06:59 06:59 06:59


 


Intake Total   540


 


Output Total   600


 


Balance   -60











Result Diagrams: 


 10/02/18 04:23





 10/02/18 04:23





Phys Exam





- Physical Examination


Mild distress due to respiratory status


HEENT: moist MMs


Neck: no JVD


Difficult to hear lungs sounds due to upper airway radiation


Cardiovascular: RRR


Difficult to assess heart. Compaired auscultation with pulse palpitation


Gastrointestinal: soft, non-tender, no distention, positive bowel sounds


Musculoskeletal: no edema, pulses present


Neurological: normal sensation, moves all 4 limbs


Psychiatric: A&O x 3





Dx/Plan


(1) Dyspnea


Code(s): R06.00 - DYSPNEA, UNSPECIFIED   Status: Acute   





(2) Lung cancer


Code(s): C34.90 - MALIGNANT NEOPLASM OF UNSP PART OF UNSP BRONCHUS OR LUNG   

Status: Acute   





(3) HTN (hypertension)


Code(s): I10 - ESSENTIAL (PRIMARY) HYPERTENSION   Status: Acute   





(4) HLD (hyperlipidemia)


Code(s): E78.5 - HYPERLIPIDEMIA, UNSPECIFIED   Status: Acute   





(5) Hypothyroidism


Code(s): E03.9 - HYPOTHYROIDISM, UNSPECIFIED   Status: Acute   





(6) History of throat cancer


Code(s): Z85.819 - PRSNL HX OF MALIG NEOPLM OF UNSP SITE LIP,ORAL CAV,& PHARYNX

   Status: Acute   





(7) Tongue cancer


Status: Acute   





(8) History of tobacco abuse


Code(s): Z87.891 - PERSONAL HISTORY OF NICOTINE DEPENDENCE   Status: Acute   





- Plan


Plan: 





This is a 74 yo male PMH of Hypothyroidism 2/2 radiation for tongue/throat 

cancer, HLD, recent diagnosis of Lung cancer (2wk ago), HTN








Dyspnea PNA vs. COPD exacerbation


-CXR shows no signs of PNA, procalcitonin 31.79. Pt. was started on vanc and 

zosyn (10/1) in the ED and will be continued. He is also receiving PRN duonebs 

and PRN albuterol nebs. Pt has been started on 10mg dexamethasone (10/1). D-

dimer was negative. CBC was 17.1 although patient has been on PO steroids for 

dyspnea since 9/19.





Lung cancer


-Diagnoses with PET scan 2 weeks ago. Pt. had a lymph node biopsy on 9/28. Dr. Yuen is his pulmonologist. 





Hypothyroidism


-2/2 radiation. Will continue home meds once reconciled.





HTN


-Continue pt's home amlodipine and losartan/HCTZ





HLD


-Start pt. on statin





Hx of throat/tongue cancer


-Pt. has been on pureed diet since surgery in 2003. Pt. had recent, 9/18, scope 

performed by Dr. Rodrick Hatfield showing no signs of cancer. Despite this he as 

had increased dysphagia to water only. Speech has been consulted. Pt. is on 

clear liquid diet.


-Amin and oncology have been consulted. Per Wilfrid, pt. is wanting a PEG. We will 

discuss options with patient today and GI.





Hx of fall


-No current risk of fall. Was 2/2 poor judgment





Hx of tobacco abuse


-30 pack year history, quit 10 years ago











Code: full


Prophylaxis: Pepcid, lovenox


Family: Wife at bedside, plan discussed with her.


Disposition: Admit to oncology, DC in 3-4 days

## 2018-10-02 NOTE — OP
PREOPERATIVE DIAGNOSIS:  Oropharyngeal dysphagia.

 

DESCRIPTION OF PROCEDURE:  After informed consent was obtained, the patient was placed in the supine 
position.  Anesthesia was administered per the Anesthesia Department.  Forward-viewing endoscope was 
inserted into the esophagus under direct visualization with ease and passed to the second portion of 
the duodenum with ease.  Second portion of duodenum and duodenal bulb were normal.  The pylorus, antr
um, body, fundus, and cardia were normal.  Previous PEG site was noted.  The area was prepped and asuncion
ped in the usual manner.  Anesthesia was applied with 1% lidocaine without epinephrine.  A needle was
 inserted through the abdominal wall on the first pass.  A wire was passed, snared, and brought out o
f the mouth.  PEG bumper was attached and brought through the abdominal wall after a small incision w
as made.  The scope was not reinserted to check for PEG position secondary to the patient's difficult
 airway.

 

ASSESSMENT:  Successful percutaneous endoscopic gastrostomy.

 

RECOMMENDATIONS:  Begin tube feedings in 8 hours.

## 2018-10-02 NOTE — PQF
DATE:       10-4-18                                                            
           ATTN:   DR. YANELI BRAMBILA



Please exercise your independent, professional judgment in responding to the 
clarification form. 

Clinical indicators are provided on the bottom of this form for your review



Please check appropriate box(s) to clarify if the following diagnosis has been 
ruled in or  ruled out:

____________SEPSIS_________________________________________________



[  ] Ruled in diagnosis

     [  ] Continue to treat        [  ] Resolved

[ x ] Ruled out diagnosis

[  ] Other diagnosis ___________

[  ] Unable to determine



In addition, please specify:

Present on Admission (POA):  [ x ] Yes             [  ] No             [  ] 
Unable to determine



Pt. met SIRS criteria on admission with a concern for pneumonia as the source. 
This concern has been ruled out.



For continuity of documentation, please document condition throughout progress 
notes and discharge summary.  Thank You.



CLINICAL INDICATORS - SIGNS / SYMPTOMS / LABS



ER DX:   SEPSIS,  LUNG CANCER, PNEUMONIA



H&P:   SIRS CONCERN FOR SEPSIS



WBC:   10-1-18:   17.1

            10-2-18:   11.8



BANDS:   10-1-18:   25



PULSE:   10-1-18:   113,  112,  102,  102

               10-2-18:   102,  102,   101,  104

               ER:   106,  103



RR:  10-1-18:   24,  24,  24

        10-2-18:   24

        ER:   32,  24,  31,  26



RISK FACTORS:   H&P:   DX WITH  LUNG CA 2 WEEKS WITH 30# WEIGHT LOSS,  
INCREASED WEAKNESS, 

                                FALLS,  HX SMOKING, CHOCKING SENSATION,  HLP,  
MALIGNANCY HX OF THROAT 

                                AND TONGUE 2003



                            PN DR. RAINEY 10-2-18:   WEIGHT LOSS, ASPIRATION 
PNEUMONITIS, METASTATIC LUNG CA 



TREATMENTS:  ER:  ZOSYN IV,  VANCOMYCIN, IVF



(This form is maintained as a part of the permanent medical record)

 2015 [employer].  All Rights Reserved

CHRISTOFER [email or web address]    Office:  [phone]

                                                              

 

[facility]

## 2018-10-03 LAB
ANION GAP SERPL CALC-SCNC: 10 MMOL/L (ref 10–20)
BASOPHILS # BLD AUTO: 0 THOU/UL (ref 0–0.2)
BASOPHILS NFR BLD AUTO: 0.1 % (ref 0–1)
BUN SERPL-MCNC: 14 MG/DL (ref 8.4–25.7)
CALCIUM SERPL-MCNC: 9.5 MG/DL (ref 7.8–10.44)
CHLORIDE SERPL-SCNC: 97 MMOL/L (ref 98–107)
CO2 SERPL-SCNC: 33 MMOL/L (ref 23–31)
CREAT CL PREDICTED SERPL C-G-VRATE: 85 ML/MIN (ref 70–130)
EOSINOPHIL # BLD AUTO: 0 THOU/UL (ref 0–0.7)
EOSINOPHIL NFR BLD AUTO: 0.1 % (ref 0–10)
GLUCOSE SERPL-MCNC: 206 MG/DL (ref 83–110)
HGB BLD-MCNC: 13.5 G/DL (ref 14–18)
LYMPHOCYTES # BLD: 0.5 THOU/UL (ref 1.2–3.4)
LYMPHOCYTES NFR BLD AUTO: 3.3 % (ref 21–51)
MAGNESIUM SERPL-MCNC: 1.9 MG/DL (ref 1.6–2.6)
MCH RBC QN AUTO: 32.1 PG (ref 27–31)
MCV RBC AUTO: 102 FL (ref 78–98)
MONOCYTES # BLD AUTO: 0.4 THOU/UL (ref 0.11–0.59)
MONOCYTES NFR BLD AUTO: 2.7 % (ref 0–10)
NEUTROPHILS # BLD AUTO: 13 THOU/UL (ref 1.4–6.5)
NEUTROPHILS NFR BLD AUTO: 93.7 % (ref 42–75)
PLATELET # BLD AUTO: 364 THOU/UL (ref 130–400)
POTASSIUM SERPL-SCNC: 3.1 MMOL/L (ref 3.5–5.1)
RBC # BLD AUTO: 4.2 MILL/UL (ref 4.7–6.1)
SODIUM SERPL-SCNC: 137 MMOL/L (ref 136–145)
VANCOMYCIN TROUGH SERPL-MCNC: 9.5 UG/ML
WBC # BLD AUTO: 13.9 THOU/UL (ref 4.8–10.8)

## 2018-10-03 RX ADMIN — POTASSIUM CHLORIDE SCH MLS: 14.9 INJECTION, SOLUTION INTRAVENOUS at 06:02

## 2018-10-03 RX ADMIN — Medication SCH ML: at 09:47

## 2018-10-03 RX ADMIN — Medication SCH ML: at 20:45

## 2018-10-03 RX ADMIN — FAMOTIDINE SCH MG: 10 INJECTION, SOLUTION INTRAVENOUS at 20:44

## 2018-10-03 RX ADMIN — POTASSIUM CHLORIDE SCH MLS: 14.9 INJECTION, SOLUTION INTRAVENOUS at 02:00

## 2018-10-03 RX ADMIN — FAMOTIDINE SCH MG: 10 INJECTION, SOLUTION INTRAVENOUS at 09:45

## 2018-10-03 RX ADMIN — MOMETASONE FUROATE AND FORMOTEROL FUMARATE DIHYDRATE SCH PUFF: 200; 5 AEROSOL RESPIRATORY (INHALATION) at 19:02

## 2018-10-03 RX ADMIN — Medication PRN ML: at 20:45

## 2018-10-03 RX ADMIN — MOMETASONE FUROATE AND FORMOTEROL FUMARATE DIHYDRATE SCH PUFF: 200; 5 AEROSOL RESPIRATORY (INHALATION) at 07:46

## 2018-10-03 NOTE — PRG
DATE OF SERVICE:  10/03/2018

 

This morning he is awake, alert, responsive, still has stridor from his vocal cord paralysis. 

 

PHYSICAL EXAMINATION: 

VITAL SIGNS:  Blood pressure 174/96, sats are 96% on 2 liters, temperature 97, respirations 18. 

CHEST:  Chest reveals decreased breath sounds without any wheezing.

CARDIAC:  Normal S1, S2.

ABDOMEN:  Soft, no masses.

 

LABORATORY:  White count 13,000, H&H 13 and 42, platelet count normal.  Electrolytes normal.

 

I spoke to the pathologist today.  He tells me the biopsy was consistent with a neuroendocrine tumor,
 small cell cancer.

 

Oncologic was notified about this.

 

IMPRESSION:

1.  Extensive small cell carcinoma.

2.  Vocal cord paralysis.

3.  Marked weight loss.

 

PLAN:  Chemotherapy is going to be initiated.  He has a PEG for nutrition.

 

Home when done with chemotherapy.

## 2018-10-03 NOTE — CON
DATE OF CONSULTATION:  10/02/2018

 

REASON FOR CONSULTATION:  Metastatic lung cancer.

 

HISTORY OF PRESENT ILLNESS:  A 73-year-old  male with newly diagnosed 
poorly differentiated carcinoma of the left upper lung with mets to adrenal 
gland and bone, history of tongue and floor of mouth cancer in 2003 status post 
resection and radiation, presenting to the ER with worsening shortness of 
breath.  The patient has been having worsening of dyspnea over the last couple 
of weeks and productive cough productive of thick yellow sputum.  CT scan 
showed pneumonitis versus possible pneumonia.  The patient states in February, 
he had a fall and since then has had progressive fatigue, weakness and a 45-
pound weight loss.  The patient was seen by Dr. Yuen in 09/2018.  The patient 
was found to have peripheral adenopathy and had a left axillary lymph node 
biopsy that showed poorly differentiated carcinoma suggestive of lung primary; 
however, further stains are pending to clarify pathologic subtype.  The patient 
had a PET scan that showed diffuse metastases to adrenal gland, bone and lymph 
nodes in the chest, axilla and supraclavicular.  The patient does complain of 
mild headaches, which he attributes to stress related to new diagnosis of 
cancer.  He otherwise denies any difficulty with walking except feeling weak 
over the past few months.  The patient is a former smoker of a pack a day for 
at least 30 years and quit in 2003 when he was diagnosed with oral cavity 
cancer.  Today, the patient states his breathing has not improved since 
admission to the hospital yesterday and has been unable to sleep due to his 
dyspnea and anxiety.  The patient's wife is at the bedside providing most of 
the history.

 

REVIEW OF SYSTEMS:  Ten-point review of systems negative except as per HPI.

 

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 
tongue and radiation, lung cancer diagnosed in 2018, hypertension, 
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

 

PAST SURGICAL HISTORY:  Oral cavity, throat and tongue resection.

 

FAMILY HISTORY:  None.

 

SOCIAL HISTORY:  Former smoker, 1 pack per day for 30 plus years, stopped in 
2003.  Occasional alcohol use, none recently.  No illicit drugs.

 

ALLERGIES:  No known drug allergies.

 

CURRENT MEDICATIONS:  Reviewed.

 

PHYSICAL EXAMINATION:

VITAL SIGNS:  Temperature 97.8, pulse 97, blood pressure 166/97 to 189/11, 
respirations 20, satting 95% on room air.

 

LABORATORY DATA:  White blood cells 17.1 on admission, down to 11.8 today, 
hemoglobin 14.1, platelets 318,000.  Sodium 136, potassium 3.1, BUN 11, 
creatinine 0.70, albumin 3.9, total bilirubin 1.3, AST 26, ALT 64, alkaline 
phosphatase 69.  Lactic acid 1.5.

 

IMAGING DATA:  CT angio of the chest with and without contrast dated 10/01/2018 
shows no definite central pulmonary embolus, findings suspicious for aspiration 
pneumonitis in both lower lobes and right middle lobe, scattered pulmonary 
nodules within both lungs suspicious for metastatic disease.  Nodular opacity 
within the left suprahilar region may reflect an enlarged lymph node versus 
suprahilar mass.  Large lymph node within the AP window of the mediastinum 
suspicious for malignant lymphadenopathy.  Enlarged lymph nodes, left axillary 
region suspicious for metastatic disease, left adrenal gland mass suspicious 
for metastatic disease.  PET scan dated 09/21/2018 shows widespread metastatic 
disease.  Left upper lobe lung neoplasm is favored primary.  Heterogenous mass 
of the posterior aspect of the left upper lobe with maximum SUV of 5, uptake at 
left adrenal gland mass maximum SUV of 10.2.  Focal areas of abnormal uptake in 
the bone showed maximum SUV of 5 at the left acetabulum and 4.8 at the 
posterior aspect of the L5 vertebral body.  Left supraclavicular lymph node 
with maximum SUV of 4.9, left prepectoral lymph node 9.5, left axillary lymph 
node with maximum SUV 13.1 and AP window lymph node with maximum SUV 15.6.  
Uptake associated with posterior pharynx at the level of the epiglottis with 
maximum SUV of 4.8.  Diffuse uptake through the thyroid gland including each 
lobe and isthmus, there is a maximum SUV of 8.3.  It is thought this is less 
likely related to metastatic disease and other thyroid abnormalities such as 
acute or chronic thyroiditis.  Pathology, poorly differentiated carcinoma.

 

ASSESSMENT AND PLAN:  A 73-year-old  male with history of oral cavity 
cancer status post resection and radiation in 2003, now presenting with new 
diagnosis of poorly differentiated carcinoma of the left upper lung with mets 
to adrenal gland, bone and multiple lymph node stations in the chest, neck and 
under the arm.  The patient has lost 45 pounds in the last 7-8 months and has 
progressively worsening fatigue and weakness, shortness of breath and cough.  A 
left axillary LN was biopsied. Pathology currently shows a poorly 
differentiated carcinoma, however, the subtype is unclear and additional 
immunostains are pending to determine if this is a non-small cell versus a 
small cell lung cancer.  The patient does require MRI brain for complete 
staging of his lung cancer.  Depending on the subtype, the patient could be 
offered chemotherapy, immunotherapy or targeted agents.  I have discussed these 
possible treatment options with the patient and his wife and we will follow up 
the MRI brain and final pathology results to determine the optimal treatment 
plan for the patient.  The patient also just received a PEG tube for nutrition 
as he has had severe dysphagia and unable to swallow and is not receiving any 
nutrition and becoming weaker.  The patient will require improvement in his 
nutritional status and his strength prior to receiving any treatment.  We will 
follow along with this patient with you and return to speak to the family after 
pathology results return.

 

Thank you for this consult.

 

COURTNEY

## 2018-10-03 NOTE — MRI
MRI BRAIN WITH AND WITHOUT CONTRAST:

 

History: Metastatic lung cancer. Evaluate for brain metastases. 

 

Technique: Brain MRI is performed with and without intravenous gadolinium administration. Multisequen
tial, multiplanar imaging performed. 

 

FINDINGS: 

The examination is not complete due to patient's inability to lay flat and due to difficulty breathin
g. 

 

No hemorrhage on the axial gradient echo sequence. 

 

No parenchymal mass, mass effect of midline shift. Brain volume, age appropriate. Cortical gray white
 matter differentiation preserved. 

 

Ventricles and sulci are patent and symmetric. 

 

Central arterial flow voids are maintained. Absent restricted diffusion. 

 

T2 and FLAIR white matter hyperintensity due to chronic small vessel ischemic change. 

 

Mild mucosal thickening of the paranasal sinuses. Bilateral mastoid sinus mucosal thickening. There i
s a mucous retention cyst in the right maxillary sinus. 

 

Post contrast images are somewhat limited due to motion. No pathologic enhancement of the brain paren
chyma. 

 

IMPRESSION: 

Limited evaluation due to motion and patient's inability to complete the examination. Based on the im
ages provided, there is no evidence of brain parenchymal metastases. T2 and FLAIR white matter hyperi
ntensities are most compatible with chronic small vessel ischemic changes. 

 

POS: SANTIAGO

## 2018-10-03 NOTE — PRG
DATE OF SERVICE:  10/03/2018

 

SUBJECTIVE:  The patient is feeling much better.  He has had multiple bowel movements.  He is tolerat
ing tube feedings.  No problems with the PEG per nursing personnel.

 

OBJECTIVE:

VITAL SIGNS:  Temperature 97.6, pulse 107, respiratory rate 20, blood pressure 174/96.

HEENT:  Unchanged.

CHEST:  Show expiratory wheeze.

CARDIOVASCULAR:  Regular rate and rhythm.

ABDOMEN:  Soft, nontender, without organomegaly or masses.  The PEG site looks good.  The bumper is a
djusted, tube feedings are going.

EXTREMITIES:  Unchanged.

 

LABORATORY DATA:  Shows a white blood cell count 13.9, hemoglobin 13.4, hematocrit of 42.9.  Chemistr
y showed potassium 3.1, CO2 33, glucose 206.

 

ASSESSMENT:

1.  Oropharyngeal dysphagia.

2.  Weight loss.

3.  Aspiration pneumonitis.

4.  Metastatic lung cancer.

 

RECOMMENDATIONS:

1.  Continuing tube feedings.

2.  Reconsult if any PEG problems occur.

## 2018-10-03 NOTE — PRG
DATE OF SERVICE:  10/03/2018

 

This is an addendum to the note of Dr. Tal Thorne.

 

Mr. Paz is having obvious stridor as before.  He appears to be quite uncomfortable.  He was jus
t visited by the oncologist and they are to start chemotherapy later this afternoon or tomorrow.  We 
will continue to follow up with his multi-team specialists.

## 2018-10-03 NOTE — PDOC.FM
- Objective


MAR Reviewed: Yes


Vital Signs & Weight: 


 Vital Signs (12 hours)











  Temp Pulse Resp BP Pulse Ox


 


 10/03/18 04:00  97.7 F  98  20  170/90 H  96


 


 10/03/18 02:43   98  20   100


 


 10/02/18 23:51  98.2 F  96  16  129/76  93 L


 


 10/02/18 22:55   100  20   90 L


 


 10/02/18 21:29   102 H  20  166/83 H  95


 


 10/02/18 20:00  97.8 F  105 H  18  140/90  95


 


 10/02/18 19:38   99  20   94 L


 


 10/02/18 17:45  97.8 F  94  20  176/104 H  95








 Weight











Admit Weight                   68.22 kg


 


Weight                         68.22 kg














I&O: 


 











 10/01/18 10/02/18 10/03/18





 06:59 06:59 06:59


 


Intake Total  540 1440


 


Output Total  600 600


 


Balance  -60 840











Result Diagrams: 


 10/03/18 03:56





 10/03/18 03:56





Dx/Plan


(1) Dyspnea


Code(s): R06.00 - DYSPNEA, UNSPECIFIED   Status: Acute   





(2) Lung cancer


Code(s): C34.90 - MALIGNANT NEOPLASM OF UNSP PART OF UNSP BRONCHUS OR LUNG   

Status: Acute   





(3) HTN (hypertension)


Code(s): I10 - ESSENTIAL (PRIMARY) HYPERTENSION   Status: Acute   





(4) HLD (hyperlipidemia)


Code(s): E78.5 - HYPERLIPIDEMIA, UNSPECIFIED   Status: Acute   





(5) Hypothyroidism


Code(s): E03.9 - HYPOTHYROIDISM, UNSPECIFIED   Status: Acute   





(6) History of throat cancer


Code(s): Z85.819 - PRSNL HX OF MALIG NEOPLM OF UNSP SITE LIP,ORAL CAV,& PHARYNX

   Status: Acute   





(7) Tongue cancer


Status: Acute   





(8) History of tobacco abuse


Code(s): Z87.891 - PERSONAL HISTORY OF NICOTINE DEPENDENCE   Status: Acute   





- Plan


Plan: 





This is a 72 yo male PMH of Hypothyroidism 2/2 radiation for tongue/throat 

cancer, HLD, recent diagnosis of Lung cancer (2wk ago), HTN








Dyspnea PNA vs. COPD exacerbation vs. mass effect in throat, leading to acute 

respiratory failure


-Pt. met SIRS criteria on admission due to heart rate, breathing, and WBC. CXR 

shows no signs of PNA, procalcitonin 31.79. Pt. was started on vanc and zosyn (

10/1) in the ED and will be continued. He is also receiving PRN duonebs and PRN 

albuterol nebs. Pt has been started on 10mg dexamethasone (10/1). D-dimer was 

negative. CBC was 17.1 although patient has been on PO steroids for dyspnea 

since 9/19.





Lung cancer


-Diagnoses with PET scan 2 weeks ago. Pt. had a lymph node biopsy on 9/28. Dr. Yuen is his pulmonologist. Dr. Jj 





Hypothyroidism


-2/2 radiation. Will continue home meds once reconciled.





HTN


-Continue pt's home amlodipine and losartan/HCTZ





HLD


-Start pt. on statin





Hx of throat/tongue cancer


-Pt. has been on pureed diet since surgery in 2003. Pt. had recent, 9/18, scope 

performed by Dr. Rodrick Hatfield showing no signs of cancer. Despite this he as 

had increased dysphagia to water only. Speech has been consulted. Pt. is on 

clear liquid diet.


-Amin and oncology have been consulted. Pt. received PEG tube yesterday. 

Consult dietetics. 





Protein calorie malnutrition


-Likely 2/2 to progressive dysphagia and previous oral surgery. Pt. had PEG 

tube placed yesterday to allow for nutrition and oral medications.





Hypokalemia


-We are currently replacing his potassium IV. We will likely start PO potassium 

through peg tube if there is no change today





Hypophosphatemia


-Likely 2/2 poor nutrition. We will be replacing with potassium today.








Hx of fall


-No current risk of fall. Was 2/2 poor judgment





Hx of tobacco abuse


-30 pack year history, quit 10 years ago











Code: full


Prophylaxis: Pepcid, lovenox


Family: Wife at bedside, plan discussed with her.


Disposition: Admit to oncology, DC in 3-4 days

## 2018-10-04 LAB
ANION GAP SERPL CALC-SCNC: 11 MMOL/L (ref 10–20)
BASOPHILS # BLD AUTO: 0 THOU/UL (ref 0–0.2)
BASOPHILS NFR BLD AUTO: 0 % (ref 0–1)
BUN SERPL-MCNC: 16 MG/DL (ref 8.4–25.7)
CALCIUM SERPL-MCNC: 9.5 MG/DL (ref 7.8–10.44)
CHLORIDE SERPL-SCNC: 95 MMOL/L (ref 98–107)
CO2 SERPL-SCNC: 36 MMOL/L (ref 23–31)
CREAT CL PREDICTED SERPL C-G-VRATE: 91 ML/MIN (ref 70–130)
EOSINOPHIL # BLD AUTO: 0 THOU/UL (ref 0–0.7)
EOSINOPHIL NFR BLD AUTO: 0.2 % (ref 0–10)
GLUCOSE SERPL-MCNC: 169 MG/DL (ref 83–110)
HGB BLD-MCNC: 13.5 G/DL (ref 14–18)
LYMPHOCYTES # BLD: 0.4 THOU/UL (ref 1.2–3.4)
LYMPHOCYTES NFR BLD AUTO: 2.3 % (ref 21–51)
MAGNESIUM SERPL-MCNC: 2 MG/DL (ref 1.6–2.6)
MCH RBC QN AUTO: 33 PG (ref 27–31)
MCV RBC AUTO: 102 FL (ref 78–98)
MONOCYTES # BLD AUTO: 0.5 THOU/UL (ref 0.11–0.59)
MONOCYTES NFR BLD AUTO: 3.1 % (ref 0–10)
NEUTROPHILS # BLD AUTO: 14.9 THOU/UL (ref 1.4–6.5)
NEUTROPHILS NFR BLD AUTO: 94.4 % (ref 42–75)
PLATELET # BLD AUTO: 382 THOU/UL (ref 130–400)
POTASSIUM SERPL-SCNC: 3 MMOL/L (ref 3.5–5.1)
RBC # BLD AUTO: 4.09 MILL/UL (ref 4.7–6.1)
SODIUM SERPL-SCNC: 139 MMOL/L (ref 136–145)
WBC # BLD AUTO: 15.7 THOU/UL (ref 4.8–10.8)

## 2018-10-04 RX ADMIN — Medication SCH ML: at 20:52

## 2018-10-04 RX ADMIN — Medication PRN ML: at 20:52

## 2018-10-04 RX ADMIN — FAMOTIDINE SCH MG: 10 INJECTION, SOLUTION INTRAVENOUS at 20:49

## 2018-10-04 RX ADMIN — MOMETASONE FUROATE AND FORMOTEROL FUMARATE DIHYDRATE SCH PUFF: 200; 5 AEROSOL RESPIRATORY (INHALATION) at 06:44

## 2018-10-04 RX ADMIN — MOMETASONE FUROATE AND FORMOTEROL FUMARATE DIHYDRATE SCH PUFF: 200; 5 AEROSOL RESPIRATORY (INHALATION) at 18:11

## 2018-10-04 RX ADMIN — FAMOTIDINE SCH MG: 10 INJECTION, SOLUTION INTRAVENOUS at 09:06

## 2018-10-04 RX ADMIN — Medication SCH ML: at 09:07

## 2018-10-04 NOTE — PRG
DATE OF SERVICE:  10/04/2018

 

This is a 73-year-old gentleman who is doing much better.  He is less short of breath.  He will be st
arted on chemotherapy for small cell cancer, Etoposide.   Otherwise, he is doing quite well.  He is l
ess short of breath, less coughing, less wheezing.

 

PHYSICAL EXAMINATION:

VITAL SIGNS:  Blood pressure is 159/91, sats are 92% on room air, respiration rate 18, temperature 98
.

CHEST:  Chest revealed decreased breath sounds, no wheezing.

CARDIAC:  Normal S1-S2.  No gallops.

ABDOMEN:  Soft, no masses. 

 

IMPRESSION:  1.  Small cell bronchogenic carcinoma.  

2.  Extensive adenopathy.

 

PLAN:  Continue chemotherapy.

 

The patient may want to get a nebulizer before discharge.  We will arrange for that.

 

Home early next week post-chemotherapy.

## 2018-10-04 NOTE — PDOC.FM
- Subjective


Subjective: 





Pt. reports he had a good night. He slept the most he has and his breathing is 

improved. He made a few jokes as well. Wife also reports it was a better night. 

Pt denies cp, nausea, abdominal pain aside from where they placed the PEG tube.





- Objective


MAR Reviewed: Yes


Vital Signs & Weight: 


 Vital Signs (12 hours)











  Temp Pulse Resp BP Pulse Ox


 


 10/04/18 04:00  97.8 F  105 H  20  169/99 H  95


 


 10/04/18 02:42   103 H  18   94 L


 


 10/04/18 00:00  98.0 F  102 H  18  153/86 H  98


 


 10/03/18 22:47   107 H  20   96


 


 10/03/18 20:00      99


 


 10/03/18 19:55  98.1 F  102 H  18  164/93 H  99


 


 10/03/18 19:00   106 H  18   95








 Weight











Admit Weight                   68.22 kg


 


Weight                         68.492 kg














I&O: 


 











 10/02/18 10/03/18 10/04/18





 06:59 06:59 06:59


 


Intake Total 540 3055 1678


 


Output Total 


 


Balance -60 9305 -233











Result Diagrams: 


 10/04/18 04:48





 10/04/18 04:48





Phys Exam





- Physical Examination


Constitutional: NAD


HEENT: moist MMs


Neck: no JVD


Continued stridorous breathing, difficult to assess


Continued stridorous breathing, difficult to assess


Gastrointestinal: soft, no distention, positive bowel sounds


non tender over areas not effected by PEG tube


Musculoskeletal: no edema, pulses present


Neurological: moves all 4 limbs


Psychiatric: normal affect, A&O x 3


Deviation from normal: Improved spirits


Skin: normal turgor





Dx/Plan


(1) Dyspnea


Code(s): R06.00 - DYSPNEA, UNSPECIFIED   Status: Acute   





(2) Lung cancer


Code(s): C34.90 - MALIGNANT NEOPLASM OF UNSP PART OF UNSP BRONCHUS OR LUNG   

Status: Acute   





(3) HTN (hypertension)


Code(s): I10 - ESSENTIAL (PRIMARY) HYPERTENSION   Status: Acute   





(4) HLD (hyperlipidemia)


Code(s): E78.5 - HYPERLIPIDEMIA, UNSPECIFIED   Status: Acute   





(5) Hypothyroidism


Code(s): E03.9 - HYPOTHYROIDISM, UNSPECIFIED   Status: Acute   





(6) History of throat cancer


Code(s): Z85.819 - PRSNL HX OF MALIG NEOPLM OF UNSP SITE LIP,ORAL CAV,& PHARYNX

   Status: Acute   





(7) Tongue cancer


Status: Acute   





(8) History of tobacco abuse


Code(s): Z87.891 - PERSONAL HISTORY OF NICOTINE DEPENDENCE   Status: Acute   





- Plan


Plan: 





This is a 72 yo male PMH of Hypothyroidism 2/2 radiation for tongue/throat 

cancer, HLD, recent diagnosis of Lung cancer (2wk ago), HTN








Dyspnea PNA vs. COPD exacerbation vs. mass effect in throat, leading to acute 

respiratory failure


-Pt. met SIRS criteria on admission due to heart rate, breathing, and WBC. CXR 

shows no signs of PNA, procalcitonin 31.79. Pt. was started on vanc and zosyn (

10/1) in the ED and will be continued. He is also receiving PRN duonebs and PRN 

albuterol nebs. Pt. has been on steroids since admission (10/1). D-dimer was 

negative. CBC was 15.7 today although patient has been on PO steroids for 

dyspnea since 9/19. Dr. Yuen is  not as convinced that this is infectious in 

nature and we have stopped abx at this time. 





Lung cancer


-Diagnoses with PET scan 2 weeks ago. Pt. had a lymph node biopsy on 9/28. Dr. Yuen is his pulmonologist. Dr. Jj 





Hypothyroidism


-2/2 radiation. Will continue home meds once reconciled.





HTN


-Continue pt's home amlodipine and losartan/HCTZ





HLD


-Start pt. on statin





Hx of throat/tongue cancer


-Pt. has been on pureed diet since surgery in 2003. Pt. had recent, 9/18, scope 

performed by Dr. Rodrick Hatfield showing no signs of cancer. Despite this he as 

had increased dysphagia to water only. Speech has been consulted. Pt. is on 

clear liquid diet.


-Amin and oncology have been consulted. Pt. received PEG tube yesterday. 

Consult dietetics. 





Protein calorie malnutrition


-Likely 2/2 to progressive dysphagia and previous oral surgery. Pt. had PEG 

tube placed yesterday to allow for nutrition and oral medications.





Hypokalemia


-We are replacing PO potassium. His levels are 3.0 in this pt.





Hypophosphatemia


-Likely 2/2 poor nutrition. We are replacing phosphorous








Hx of fall


-No current risk of fall. Was 2/2 poor judgment





Hx of tobacco abuse


-30 pack year history, quit 10 years ago











Code: full


Prophylaxis: Pepcid, lovenox


Family: Wife at bedside, plan discussed with her.


Disposition: Admit to oncology, DC in 3-4 days

## 2018-10-05 LAB
ANION GAP SERPL CALC-SCNC: 12 MMOL/L (ref 10–20)
BASOPHILS # BLD AUTO: 0 THOU/UL (ref 0–0.2)
BASOPHILS NFR BLD AUTO: 0 % (ref 0–1)
BUN SERPL-MCNC: 18 MG/DL (ref 8.4–25.7)
CALCIUM SERPL-MCNC: 9.1 MG/DL (ref 7.8–10.44)
CHLORIDE SERPL-SCNC: 95 MMOL/L (ref 98–107)
CO2 SERPL-SCNC: 34 MMOL/L (ref 23–31)
CREAT CL PREDICTED SERPL C-G-VRATE: 100 ML/MIN (ref 70–130)
EOSINOPHIL # BLD AUTO: 0.1 THOU/UL (ref 0–0.7)
EOSINOPHIL NFR BLD AUTO: 0.3 % (ref 0–10)
GLUCOSE SERPL-MCNC: 157 MG/DL (ref 83–110)
HGB BLD-MCNC: 13 G/DL (ref 14–18)
LYMPHOCYTES # BLD: 0.2 THOU/UL (ref 1.2–3.4)
LYMPHOCYTES NFR BLD AUTO: 1.5 % (ref 21–51)
MCH RBC QN AUTO: 32.3 PG (ref 27–31)
MCV RBC AUTO: 102 FL (ref 78–98)
MONOCYTES # BLD AUTO: 1.3 THOU/UL (ref 0.11–0.59)
MONOCYTES NFR BLD AUTO: 8 % (ref 0–10)
NEUTROPHILS # BLD AUTO: 14.3 THOU/UL (ref 1.4–6.5)
NEUTROPHILS NFR BLD AUTO: 90.1 % (ref 42–75)
PLATELET # BLD AUTO: 335 THOU/UL (ref 130–400)
POTASSIUM SERPL-SCNC: 3.3 MMOL/L (ref 3.5–5.1)
RBC # BLD AUTO: 4.04 MILL/UL (ref 4.7–6.1)
SODIUM SERPL-SCNC: 138 MMOL/L (ref 136–145)
WBC # BLD AUTO: 15.9 THOU/UL (ref 4.8–10.8)

## 2018-10-05 RX ADMIN — Medication SCH ML: at 10:50

## 2018-10-05 RX ADMIN — MOMETASONE FUROATE AND FORMOTEROL FUMARATE DIHYDRATE SCH PUFF: 200; 5 AEROSOL RESPIRATORY (INHALATION) at 19:41

## 2018-10-05 RX ADMIN — Medication PRN ML: at 14:33

## 2018-10-05 RX ADMIN — FAMOTIDINE SCH MG: 10 INJECTION, SOLUTION INTRAVENOUS at 10:49

## 2018-10-05 RX ADMIN — SALINE NASAL SPRAY SCH: 1.5 SOLUTION NASAL at 20:54

## 2018-10-05 RX ADMIN — SALINE NASAL SPRAY SCH SPR: 1.5 SOLUTION NASAL at 17:27

## 2018-10-05 RX ADMIN — Medication SCH ML: at 20:53

## 2018-10-05 RX ADMIN — Medication PRN ML: at 20:54

## 2018-10-05 RX ADMIN — MOMETASONE FUROATE AND FORMOTEROL FUMARATE DIHYDRATE SCH PUFF: 200; 5 AEROSOL RESPIRATORY (INHALATION) at 07:08

## 2018-10-05 RX ADMIN — FAMOTIDINE SCH MG: 10 INJECTION, SOLUTION INTRAVENOUS at 20:53

## 2018-10-05 NOTE — PDOC.FM
- Subjective


Subjective: 





Pt. reports he slept ok today. He reports a minor headache but otherwise is 

doing ok. Denies sob, cp, or abdominal pain.





- Objective


MAR Reviewed: Yes


Vital Signs & Weight: 


 Vital Signs (12 hours)











  Temp Pulse Resp BP Pulse Ox


 


 10/05/18 02:03  98.0 F  95  20  166/96 H  99


 


 10/05/18 01:51   95  22 H   99


 


 10/04/18 22:11   99  20   98


 


 10/04/18 20:00      96


 


 10/04/18 19:58  98.1 F  104 H  18  166/88 H  96


 


 10/04/18 18:10   91  18   99








 Weight











Admit Weight                   68.22 kg


 


Weight                         68.492 kg














I&O: 


 











 10/03/18 10/04/18 10/05/18





 06:59 06:59 06:59


 


Intake Total 3055 2379 1840


 


Output Total 600 2375 30


 


Balance 2455 4 1810











Result Diagrams: 


 10/05/18 04:23





 10/05/18 04:23





Phys Exam





- Physical Examination


Constitutional: NAD


HEENT: moist MMs


Neck: no JVD


Upper airway stridor obscures auscultation


Upper airway stridor obscures auscultation


Gastrointestinal: soft, non-tender, no distention, positive bowel sounds


Musculoskeletal: no edema, pulses present


Neurological: moves all 4 limbs


Psychiatric: A&O x 3


Skin: normal turgor





Dx/Plan


(1) Dyspnea


Code(s): R06.00 - DYSPNEA, UNSPECIFIED   Status: Acute   





(2) Lung cancer


Code(s): C34.90 - MALIGNANT NEOPLASM OF UNSP PART OF UNSP BRONCHUS OR LUNG   

Status: Acute   





(3) HTN (hypertension)


Code(s): I10 - ESSENTIAL (PRIMARY) HYPERTENSION   Status: Acute   





(4) HLD (hyperlipidemia)


Code(s): E78.5 - HYPERLIPIDEMIA, UNSPECIFIED   Status: Acute   





(5) Hypothyroidism


Code(s): E03.9 - HYPOTHYROIDISM, UNSPECIFIED   Status: Acute   





(6) History of throat cancer


Code(s): Z85.819 - PRSNL HX OF MALIG NEOPLM OF UNSP SITE LIP,ORAL CAV,& PHARYNX

   Status: Acute   





(7) Tongue cancer


Status: Acute   





(8) History of tobacco abuse


Code(s): Z87.891 - PERSONAL HISTORY OF NICOTINE DEPENDENCE   Status: Acute   





- Plan


Plan: 





This is a 74 yo male PMH of Hypothyroidism 2/2 radiation for tongue/throat 

cancer, HLD, recent diagnosis of Lung cancer (2wk ago), HTN








Dyspnea PNA vs. COPD exacerbation vs. mass effect in throat, leading to acute 

respiratory failure


-Pt. met SIRS criteria on admission due to heart rate, breathing, and WBC. CXR 

shows no signs of PNA, procalcitonin 31.79. Pt. was started on vanc and zosyn (

10/1) in the ED and will be continued. He is also receiving PRN duonebs and PRN 

albuterol nebs. Pt. has been on steroids since admission (10/1). D-dimer was 

negative. CBC was 15.7 today although patient has been on PO steroids for 

dyspnea since 9/19. Dr. Yuen is  not as convinced that this is infectious in 

nature and we have stopped abx at this time. 





Lung cancer


-Diagnoses with PET scan 2 weeks ago. Pt. 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 

showing small cell bronchogenic carcinoma. Dr. Yuen is his pulmonologist. Dr. Jj is the oncologist. Pt. is undergoing day two of chemotherapy. 





Hypothyroidism


-2/2 radiation. Will continue home meds once reconciled.





HTN


-Continue pt's home amlodipine and losartan/HCTZ





HLD


-Start pt. on statin





Hx of throat/tongue cancer


-Pt. has been on pureed diet since surgery in 2003. Pt. had recent, 9/18, scope 

performed by Dr. Rodrick Hatfield showing no signs of cancer. Despite this he as 

had increased dysphagia to water only. Speech has been consulted. Pt. is on 

clear liquid diet.


-Amin and oncology have been consulted. Pt. received PEG tube yesterday. 

Consult dietetics. Pt. will be going home on tube feedings and will be 

evaluated by the infusion company today.





Protein calorie malnutrition


-Likely 2/2 to progressive dysphagia and previous oral surgery. Pt. had PEG 

tube placed yesterday to allow for nutrition and oral medications. Pt. will 

need nutritional supplementation through peg tube for greater than 90 days. He 

is currently receiving Jevity 1.5 via peg tube. We are trialling boluses today. 





Physical deconditioning


-Pt. has had poor oral intake and decreased activity for an extended period of 

time. He will require a wheelchair in the outpt. setting for mobility. Pt. can 

not tolerate walking with walker.





Hypokalemia


-We are replacing PO potassium. His levels are 3.0 in this pt.





Hypophosphatemia


-Likely 2/2 poor nutrition. We are replacing phosphorous








Hx of fall


-No current risk of fall. Was 2/2 poor judgment





Hx of tobacco abuse


-30 pack year history, quit 10 years ago











Code: full


Prophylaxis: Pepcid, lovenox


Family: Wife at bedside, plan discussed with her.


Disposition: Admit to oncology, DC in 3-4 days

## 2018-10-05 NOTE — PRG
DATE OF SERVICE:  10/05/2018

 

He said he was having difficulty breathing last night.  He took an extra breathing treatment though h
e clearly is better this morning without any coughing or wheezing.

 

He wants a home nebulizer for which one is being prescribed.  

 

PHYSICAL EXAMINATION: 

VITAL SIGNS:  Sats are 96 on room air, respiration 20, temperature is 97, blood pressure is 168/97.

CHEST:  Chest reveals decreased breath sounds, no wheezing.

CARDIAC:  Normal S1, S2.

ABDOMEN:  Soft, no masses.  

 

Lab is unremarkable.

 

IMPRESSION:

1.  Metastatic small cell.  

2.  Chronic obstructive pulmonary disease.  

3.  Left vocal cord paralysis.

 

PLAN:  Continue chemotherapy.  Home post-chemo followed by Oncology, and Dr. Yuen on an outpatient ba
sis.

## 2018-10-05 NOTE — ADD-PRG
ADDENDUM

 

DATE OF SERVICE:  10/05/2018

 

This is an addendum to the note of Dr. Tal Thorne.

 

MrDanny Craven drinker looks and feels much better.  He has only minimal stridor.  His feeding tube is in p
lace and functioning properly.  We will continue to follow with the Oncology Service.

## 2018-10-06 LAB
ANION GAP SERPL CALC-SCNC: 11 MMOL/L (ref 10–20)
BASOPHILS # BLD AUTO: 0 THOU/UL (ref 0–0.2)
BASOPHILS NFR BLD AUTO: 0.1 % (ref 0–1)
BUN SERPL-MCNC: 13 MG/DL (ref 8.4–25.7)
CALCIUM SERPL-MCNC: 9.5 MG/DL (ref 7.8–10.44)
CHLORIDE SERPL-SCNC: 92 MMOL/L (ref 98–107)
CO2 SERPL-SCNC: 35 MMOL/L (ref 23–31)
CREAT CL PREDICTED SERPL C-G-VRATE: 101 ML/MIN (ref 70–130)
EOSINOPHIL # BLD AUTO: 0.1 THOU/UL (ref 0–0.7)
EOSINOPHIL NFR BLD AUTO: 0.6 % (ref 0–10)
GLUCOSE SERPL-MCNC: 88 MG/DL (ref 83–110)
HGB BLD-MCNC: 13.8 G/DL (ref 14–18)
LYMPHOCYTES # BLD: 1.2 THOU/UL (ref 1.2–3.4)
LYMPHOCYTES NFR BLD AUTO: 12.5 % (ref 21–51)
MCH RBC QN AUTO: 32.1 PG (ref 27–31)
MCV RBC AUTO: 103 FL (ref 78–98)
MONOCYTES # BLD AUTO: 0.3 THOU/UL (ref 0.11–0.59)
MONOCYTES NFR BLD AUTO: 3.1 % (ref 0–10)
NEUTROPHILS # BLD AUTO: 7.9 THOU/UL (ref 1.4–6.5)
NEUTROPHILS NFR BLD AUTO: 83.6 % (ref 42–75)
PLATELET # BLD AUTO: 312 THOU/UL (ref 130–400)
POTASSIUM SERPL-SCNC: 4.3 MMOL/L (ref 3.5–5.1)
RBC # BLD AUTO: 4.31 MILL/UL (ref 4.7–6.1)
SODIUM SERPL-SCNC: 134 MMOL/L (ref 136–145)
WBC # BLD AUTO: 9.5 THOU/UL (ref 4.8–10.8)

## 2018-10-06 PROCEDURE — B517ZZA FLUOROSCOPY OF LEFT SUBCLAVIAN VEIN, GUIDANCE: ICD-10-PCS | Performed by: SURGERY

## 2018-10-06 PROCEDURE — 05H633Z INSERTION OF INFUSION DEVICE INTO LEFT SUBCLAVIAN VEIN, PERCUTANEOUS APPROACH: ICD-10-PCS | Performed by: SURGERY

## 2018-10-06 RX ADMIN — MOMETASONE FUROATE AND FORMOTEROL FUMARATE DIHYDRATE SCH PUFF: 200; 5 AEROSOL RESPIRATORY (INHALATION) at 19:52

## 2018-10-06 RX ADMIN — FAMOTIDINE SCH: 10 INJECTION, SOLUTION INTRAVENOUS at 14:02

## 2018-10-06 RX ADMIN — SALINE NASAL SPRAY SCH: 1.5 SOLUTION NASAL at 18:13

## 2018-10-06 RX ADMIN — MOMETASONE FUROATE AND FORMOTEROL FUMARATE DIHYDRATE SCH: 200; 5 AEROSOL RESPIRATORY (INHALATION) at 06:33

## 2018-10-06 RX ADMIN — Medication SCH ML: at 20:04

## 2018-10-06 RX ADMIN — SALINE NASAL SPRAY SCH: 1.5 SOLUTION NASAL at 20:05

## 2018-10-06 RX ADMIN — SALINE NASAL SPRAY SCH: 1.5 SOLUTION NASAL at 14:03

## 2018-10-06 RX ADMIN — FAMOTIDINE SCH MG: 10 INJECTION, SOLUTION INTRAVENOUS at 20:06

## 2018-10-06 RX ADMIN — Medication SCH: at 18:12

## 2018-10-06 NOTE — EKG
Test Reason : SOB

Blood Pressure : ***/*** mmHG

Vent. Rate : 105 BPM     Atrial Rate : 105 BPM

   P-R Int : 128 ms          QRS Dur : 088 ms

    QT Int : 334 ms       P-R-T Axes : 060 002 071 degrees

   QTc Int : 441 ms

 

Sinus tachycardia

Possible Left atrial enlargement

Nonspecific ST abnormality

Abnormal ECG

 

Confirmed by RG GALVIN ([id number]),  ARELI CLEARY ([id number]) on 10/6/2018 3:37:34 PM

 

Referred By:             Confirmed By:RG GALVIN

## 2018-10-06 NOTE — RAD
CHEST 1 VIEW:

 

HISTORY: 

MediPort placement.

 

COMPARISON: 

10/1/2018.

 

FINDINGS: 

Cardiac silhouette is magnified by projection.  Pulmonary vasculature is unremarkable.  Mediastinum i
s midline.  The tip of a left subclavian MediPort overlies the superior vena cava.  No evidence of pn
eumothorax.

 

IMPRESSION: 

New left subclavian MediPort is in good radiograph position.

 

POS: [facility]

## 2018-10-06 NOTE — PDOC.FM
- Subjective


Subjective: 


Pt reports feeling well this AM. Denies SOB and reports improved cough. Reports 

he is ready to go for port placement today for continued chemo therapy.





ROS: Denies fevers/chill, no sob, reports cough, no cp no palpiatiations , no n/

v





- Objective


MAR Reviewed: Yes


Vital Signs & Weight: 


 Vital Signs (12 hours)











  Temp Pulse Resp BP Pulse Ox


 


 10/06/18 05:25  97.9 F  98  20  176/102 H  95


 


 10/06/18 03:49      93 L


 


 10/06/18 00:25  98.1 F  99  18  165/99 H  95


 


 10/05/18 23:43      93 L


 


 10/05/18 20:10  98.2 F  93  18  156/95 H  94 L


 


 10/05/18 20:00      94 L


 


 10/05/18 19:43      95


 


 10/05/18 19:42      95


 


 10/05/18 19:41      95


 


 10/05/18 17:43   100   157/94 H 








 Weight











Admit Weight                   68.22 kg


 


Weight                         68.492 kg














I&O: 


 











 10/04/18 10/05/18 10/06/18





 06:59 06:59 06:59


 


Intake Total 2379 2740 2580


 


Output Total 2375 30 


 


Balance 4 2710 2580











Result Diagrams: 


 10/06/18 04:48





 10/06/18 04:48





Phys Exam





- Physical Examination


Constitutional: NAD


HEENT: moist MMs, sclera anicteric


Neck: full ROM


Bilateral diffuse expiratory wheezing on expiration, no retractions


Cardiovascular: RRR, no significant murmur


Gastrointestinal: soft, non-tender


Musculoskeletal: pulses present


Neurological: normal sensation, moves all 4 limbs


Psychiatric: normal affect


Skin: no rash, normal turgor





Dx/Plan


(1) Dyspnea


Code(s): R06.00 - DYSPNEA, UNSPECIFIED   Status: Acute   





(2) HTN (hypertension)


Code(s): I10 - ESSENTIAL (PRIMARY) HYPERTENSION   Status: Acute   





(3) History of throat cancer


Code(s): Z85.819 - PRSNL HX OF MALIG NEOPLM OF UNSP SITE LIP,ORAL CAV,& PHARYNX

   Status: Acute   





(4) History of tobacco abuse


Code(s): Z87.891 - PERSONAL HISTORY OF NICOTINE DEPENDENCE   Status: Acute   





(5) Hypothyroidism


Code(s): E03.9 - HYPOTHYROIDISM, UNSPECIFIED   Status: Acute   





(6) Lung cancer


Code(s): C34.90 - MALIGNANT NEOPLASM OF UNSP PART OF UNSP BRONCHUS OR LUNG   

Status: Acute   





(7) Tongue cancer


Status: Acute   





- Plan


Plan: 


This is a 72 yo male PMH of Hypothyroidism 2/2 radiation for tongue/throat 

cancer, HLD, recent diagnosis of Lung cancer (2wk ago), HTN








Dyspnea PNA vs. COPD exacerbation vs. mass effect in throat, leading to acute 

respiratory failure


A -Respiratory status improved. Pt. met SIRS criteria on admission due to heart 

rate, breathing, and WBC. CXR shows no signs of PNA, procalcitonin 31.79. Pt. 

was started on vanc and zosyn (10/1-10/3). D-dimer was negative. CBC was 9.5 

today although patient had been on PO steroids for dyspnea since 9/19-10/4. Dr. Yuen is  not as convinced that this is infectious in nature and abx were dc'd 

on 10/3.


P- continue PRN duonebs and PRN albuterol nebs. 


- continue to monitor respiratory status





Lung cancer


A-Diagnoses with PET scan 2 weeks ago. Pt. 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 

showing small cell bronchogenic carcinoma. Dr. Yuen is his pulmonologist. Dr. Jj is the oncologist.


P- Pt. is undergoing day three of chemotherapy today


-port placement today


-possibly home today after port placement.





Hx of throat/tongue cancer


A- Pt. has been on pureed diet since surgery in 2003. Pt. had recent, 9/18, 

scope performed by Dr. Rodrick Hatfield showing no signs of cancer. Despite this 

he as had increased dysphagia to water only. Speech has been consulted. Pt. is 

on clear liquid diet. Pt. received PEG tube. Consulted dietetics. Pt. will be 

going home on tube feedings and was evaluated by the infusion company.


P- f/u on dietary and infusion company recs





Protein calorie malnutrition


A-Likely 2/2 to progressive dysphagia and previous oral surgery. Pt. had PEG 

tube placed yesterday to allow for nutrition and oral medications. Pt. will 

need nutritional supplementation through peg tube for greater than 90 days.


P- He is currently receiving Jevity 1.5 via peg tube. We are trialling boluses 





Hypothyroidism


-2/2 radiation. Will continue home meds once reconciled.





HTN


-Continue pt's home amlodipine and losartan/HCTZ





Physical deconditioning


-Pt. has had poor oral intake and decreased activity for an extended period of 

time. He will require a wheelchair in the outpt. setting for mobility. Pt. can 

not tolerate walking with walker.





Hypokalemia


-resolved





Hypophosphatemia


-Likely 2/2 poor nutrition. We are replacing phosphorous





Hx of fall


-No current risk of fall. Was 2/2 poor judgment





Hx of tobacco abuse


-30 pack year history, quit 10 years ago

## 2018-10-06 NOTE — OP
DATE OF OPERATION:  10/06/2018

 

PREOPERATIVE DIAGNOSES:

1.  Throat cancer, status post radiation.

2.  Need for access for chemotherapy.

 

POSTOPERATIVE DIAGNOSES:

1.  Throat cancer, status post radiation.

2.  Need for access for chemotherapy.

 

SURGERY PERFORMED:  Left subclavian Mediport placement under fluoroscopy.

 

SURGEON:  Ruel Bianchi D.O.

 

ANESTHESIA:  Monitored anesthesia care.

 

INDICATIONS FOR OPERATION:  A 73-year-old man with history of throat cancer, status post surgery and 
radiation therapy.  The patient requires chemotherapy and I was asked to place a Mediport for that pu
rpose.

 

DESCRIPTION OF OPERATION:  Informed consent obtained from the patient who was brought to the operatin
g room and placed in supine position.  Following monitored anesthesia care, the chest wall is sterile
ly prepped and draped in the usual fashion.  The skin below the left clavicle was anesthetized with 0
.25% Marcaine with epinephrine.  Left subclavian vein was cannulated with an 18-gauge introducer need
le returning dark venous blood.  A guidewire was passed through the needle and advanced into the left
 subclavian vein without resistance.  Needle was withdrawn over the guidewire.  Proper placement of t
he guidewire was confirmed by fluoroscopy.  Next, a stab incision was made adjacent to the guidewire 
using an 11 scalpel.  A dilator was passed over the guidewire dilating subcutaneous tissues.  A dilat
or and an introducer peel-away sheath was introduced as a unit over the guidewire and advanced into t
he left subclavian vein without resistance.  The guidewire and the dilator were removed as a unit.  T
he Mediport catheter was then advanced through the pull-away sheath and placed in the left subclavian
 vein without resistance.  The introducer sheath was peeled away.  The catheter was then fashioned to
 length under fluoroscopy.  Vertical incision was made below the insertion site using a 15 scalpel.  
Incision was carried through subcutaneous tissues maintaining hemostasis using cautery.  I raised sub
cutaneous pocket using suture scissors.  The catheter was then assembled with the Mediport well and s
ecured in the subcutaneous pocket.  The _____ was secured to the anterior chest wall using 3-0 Prolen
e suture.  Subcutaneous tissues were approximated over the port using interrupted sutures of 3-0 Vicr
yl.  The skin incision was then closed using a running stitch of 4-0 Monocryl suture in subcuticular 
fashion.  Dermabond was applied over the incision.  Prior to closure of the subcutaneous pocket, dark
 venous blood was aspirated from the well, which was flushed first with saline and then with heparin.
  The patient tolerated the operation without any apparent complication.  Portable chest x-ray was ob
tained confirming proper placement of the port and no pneumothorax present.

## 2018-10-06 NOTE — PRG
DATE OF SERVICE:  10/06/2018

 

SUBJECTIVE:  The patient is awaiting a port placement today.

 

PHYSICAL EXAMINATION:

VITAL SIGNS:  Temperature 97.6, pulse 85, respirations 16, O2 sat 95% on room air, blood pressure 177
/101.

HEENT:  Unremarkable.

NECK:  No JVD.

CHEST:  Clear.

CARDIAC:  S1, S2 regular.

ABDOMEN:  Soft.

EXTREMITIES:  Severe muscle wasting.

 

LABORATORY DATA:  White blood cell count 9.5, hematocrit 44.3, platelet count 312.  Sodium 134, potas
sium 4.3, chloride 92, CO2 of 35, BUN 13, creatinine 0.6, glucose 88.

 

ASSESSMENT:

1.  Small cell lung cancer.

2.  Chronic obstructive pulmonary.

3.  Left vocal cord paralysis.

 

PLAN:

1.  Port to be placed today.

2.  I wrote a prescription for DuoNeb and home nebulizer.

## 2018-10-07 VITALS — DIASTOLIC BLOOD PRESSURE: 72 MMHG | SYSTOLIC BLOOD PRESSURE: 109 MMHG

## 2018-10-07 VITALS — TEMPERATURE: 97.8 F

## 2018-10-07 LAB
ANION GAP SERPL CALC-SCNC: 14 MMOL/L (ref 10–20)
BASOPHILS # BLD AUTO: 0 THOU/UL (ref 0–0.2)
BASOPHILS NFR BLD AUTO: 0.1 % (ref 0–1)
BUN SERPL-MCNC: 22 MG/DL (ref 8.4–25.7)
CALCIUM SERPL-MCNC: 8.9 MG/DL (ref 7.8–10.44)
CHLORIDE SERPL-SCNC: 93 MMOL/L (ref 98–107)
CO2 SERPL-SCNC: 32 MMOL/L (ref 23–31)
CREAT CL PREDICTED SERPL C-G-VRATE: 95 ML/MIN (ref 70–130)
EOSINOPHIL # BLD AUTO: 0.1 THOU/UL (ref 0–0.7)
EOSINOPHIL NFR BLD AUTO: 1.1 % (ref 0–10)
GLUCOSE SERPL-MCNC: 116 MG/DL (ref 83–110)
HGB BLD-MCNC: 13.2 G/DL (ref 14–18)
LYMPHOCYTES # BLD: 1 THOU/UL (ref 1.2–3.4)
LYMPHOCYTES NFR BLD AUTO: 9.6 % (ref 21–51)
MCH RBC QN AUTO: 32.7 PG (ref 27–31)
MCV RBC AUTO: 102 FL (ref 78–98)
MONOCYTES # BLD AUTO: 0 THOU/UL (ref 0.11–0.59)
MONOCYTES NFR BLD AUTO: 0.3 % (ref 0–10)
NEUTROPHILS # BLD AUTO: 9.5 THOU/UL (ref 1.4–6.5)
NEUTROPHILS NFR BLD AUTO: 88.9 % (ref 42–75)
PLATELET # BLD AUTO: 250 THOU/UL (ref 130–400)
POTASSIUM SERPL-SCNC: 3.7 MMOL/L (ref 3.5–5.1)
RBC # BLD AUTO: 4.03 MILL/UL (ref 4.7–6.1)
SODIUM SERPL-SCNC: 135 MMOL/L (ref 136–145)
WBC # BLD AUTO: 10.7 THOU/UL (ref 4.8–10.8)

## 2018-10-07 RX ADMIN — Medication PRN ML: at 15:05

## 2018-10-07 RX ADMIN — SALINE NASAL SPRAY SCH SPR: 1.5 SOLUTION NASAL at 04:29

## 2018-10-07 RX ADMIN — FAMOTIDINE SCH MG: 10 INJECTION, SOLUTION INTRAVENOUS at 10:07

## 2018-10-07 RX ADMIN — MOMETASONE FUROATE AND FORMOTEROL FUMARATE DIHYDRATE SCH PUFF: 200; 5 AEROSOL RESPIRATORY (INHALATION) at 06:03

## 2018-10-07 RX ADMIN — Medication SCH ML: at 10:10

## 2018-10-07 NOTE — PDOC.FM
- Subjective


Subjective: 


Pt reports feeling well this AM. He says he tolerated the port placement well 

and feels well enough to go home. no complaints at this time.








- Objective


MAR Reviewed: Yes


Vital Signs & Weight: 


 Vital Signs (12 hours)











  Temp Pulse Resp BP Pulse Ox


 


 10/07/18 04:00  97.7 F  91  16  154/82 H  96


 


 10/06/18 23:56  98.1 F  90  20  138/93 H  96


 


 10/06/18 23:28      96


 


 10/06/18 20:00   87  20  120/80  93 L


 


 10/06/18 19:53      93 L


 


 10/06/18 19:52      93 L


 


 10/06/18 19:51      93 L


 


 10/06/18 19:00  98.2 F  92  18  123/79  96








 Weight











Admit Weight                   68.22 kg


 


Weight                         68.492 kg














I&O: 


 











 10/05/18 10/06/18 10/07/18





 06:59 06:59 06:59


 


Intake Total 2740 2650 1374


 


Output Total 30  


 


Balance 2710 2650 1374











Result Diagrams: 


 10/07/18 06:15





 10/07/18 06:15





Phys Exam





- Physical Examination


Constitutional: NAD


HEENT: moist MMs, sclera anicteric


Neck: no nodes, no JVD


Respiratory: no wheezing, clear to auscultation bilateral


Cardiovascular: RRR, no significant murmur


Gastrointestinal: soft, non-tender


Musculoskeletal: no edema, pulses present


Neurological: non-focal, normal sensation


Psychiatric: normal affect


Skin: no rash, normal turgor





Dx/Plan


(1) Dyspnea


Code(s): R06.00 - DYSPNEA, UNSPECIFIED   Status: Acute   





(2) HTN (hypertension)


Code(s): I10 - ESSENTIAL (PRIMARY) HYPERTENSION   Status: Acute   





(3) History of throat cancer


Code(s): Z85.819 - PRSNL HX OF MALIG NEOPLM OF UNSP SITE LIP,ORAL CAV,& PHARYNX

   Status: Acute   





(4) History of tobacco abuse


Code(s): Z87.891 - PERSONAL HISTORY OF NICOTINE DEPENDENCE   Status: Acute   





(5) Hypothyroidism


Code(s): E03.9 - HYPOTHYROIDISM, UNSPECIFIED   Status: Acute   





(6) Lung cancer


Code(s): C34.90 - MALIGNANT NEOPLASM OF UNSP PART OF UNSP BRONCHUS OR LUNG   

Status: Acute   





(7) Tongue cancer


Status: Acute   





- Plan


Plan: 


Plan: 


This is a 74 yo male PMH of Hypothyroidism 2/2 radiation for tongue/throat 

cancer, HLD, recent diagnosis of Lung cancer (2wk ago), HTN








Dyspnea PNA vs. COPD exacerbation vs. mass effect in throat, leading to acute 

respiratory failure


A -Respiratory status improved. Pt. met SIRS criteria on admission due to heart 

rate, breathing, and WBC. CXR shows no signs of PNA, procalcitonin 31.79. Pt. 

was started on vanc and zosyn (10/1-10/3). D-dimer was negative. CBC was 9.5 

today although patient had been on PO steroids for dyspnea since 9/19-10/4. Dr. Yuen is  not as convinced that this is infectious in nature and abx were dc'd 

on 10/3.


P- continue PRN duonebs and PRN albuterol nebs. 


- continue to monitor respiratory status


- Dr. Villalba has written pt home nebulizer and duonebs


- likely home today





Lung cancer


A-Diagnoses with PET scan 2 weeks ago. Pt. 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 

showing small cell bronchogenic carcinoma. Dr. Yuen is his pulmonologist. Dr. Jj is the oncologist.


P- Pt. had three days chemotherapy (10/4-6)


-port placement yesterday


- likely home today





Hx of throat/tongue cancer


A- Pt. has been on pureed diet since surgery in 2003. Pt. had recent, 9/18, 

scope performed by Dr. Rodrick Hatfield showing no signs of cancer. Despite this 

he as had increased dysphagia to water only. Speech has been consulted. Pt. is 

on clear liquid diet. Pt. received PEG tube. Consulted dietetics. Pt. will be 

going home on tube feedings and was evaluated by the infusion company.


P- Pt educated and comfortable using tube feeds at home





Protein calorie malnutrition


A-Likely 2/2 to progressive dysphagia and previous oral surgery. Pt. had PEG 

tube placed yesterday to allow for nutrition and oral medications. Pt. will 

need nutritional supplementation through peg tube for greater than 90 days.


P- He is currently receiving Jevity 1.5 via peg tube.





Hypothyroidism


-2/2 radiation. Will continue home meds once reconciled.





HTN


-Continue pt's home amlodipine and losartan/HCTZ





Physical deconditioning


-Pt. has had poor oral intake and decreased activity for an extended period of 

time. He will require a wheelchair in the outpt. setting for mobility. Pt. can 

not tolerate walking with walker.





Hypokalemia


-resolved





Hypophosphatemia


-resolved





Hx of fall


-No current risk of fall. Was 2/2 poor judgment





Hx of tobacco abuse


-30 pack year history, quit 10 years ago

## 2018-10-08 NOTE — ADD-PRG
ADDENDUM

 

Please see the note from Dr. Alvarez for which I agree.  The patient was seen and evaluated, examined
 and discussed with the residents by bedside.

 

SUBJECTIVE:  This is an unfortunate 73-year-old gentleman with small cell bronchogenic carcinoma and 
has a history of throat cancer.  White count was initially elevated, thought he was infected, but det
ermined that he is probably not.  He is on day #3 of chemo and was about to get _____ chemo access pl
aced and may be ready to go home today if this happens today, but otherwise is stable.  He has a 

 little bit of stridor, but does not appear to be in any kind of major respiratory distress.

 

PHYSICAL EXAMINATION:

GENERAL:  Alert and oriented x3, appropriate.  Has lost a lot of his tongue secondary to cancer.  Spe
nt a little time in educating him, little bit of stridorous breathing.

 

_____.

## 2018-10-08 NOTE — ADD-PRG
DATE OF SERVICE:  10/07/2018

 

Please see the note from Dr. Omar Alvarez, for which I agree.  The patient was seen, evaluated, exa
mined, and discussed with the residents by bedside.

 

This is a 73-year-old, who finished with his first round of chemotherapy for lung cancer, had a port 
placed yesterday and is ready to go home.  He has some stridor from vocal cord paralysis and has home
 nebulizers, oxygen, and neb solutions.  Follow up with Oncology.  Otherwise, exam was unchanged.  Ch
est was clear _____ stridor.  Otherwise, ready to go and will follow up with his oncologist and pulmo
nologist.

## 2018-10-08 NOTE — ADD-PRG
ADDENDUM

 

Please see the note from Dr. Omar Alvarez for which I agree.  The patient was seen and evaluated, e
xamined and discussed with the residents by bedside.

 

This gentleman is doing better.  He had three rounds of chemo, tolerating food, doing okay.  Has some
 stridor from vocal cord paralysis that is chronic and has home nebulizer and oxygen set up.  Otherwi
se, we will follow up with Oncology and his primary care physician this week.

## 2018-10-09 NOTE — DIS-2
DATE OF ADMISSION:  10/01/2018

 

DATE OF DISCHARGE:  10/07/2018

 

RESIDENT:  Omar Alvarez MD

 

ADMITTING ATTENDING:  Huseyin Dang MD

 

DISCHARGE ATTENDING:  Km Castillo MD

 

CONSULTATIONS:

1.  Dr. Yuen, Pulmonology.

2.  Dr. Jj, Oncology.

3.  Speech Therapy.

 

PROCEDURES:

1.  On 10/01/2018, chest x-ray:  Impression:  Hyperinflation, chronic change of lung parenchyma, and 
left upper lobe nodule.

2.  On 10/01/2018, chest thorax CTA:  Impression:  No definite central pulmonary embolus demonstrated
 within limitations of exam, lobar and segmental pulmonary branches are not well seen due to motion a
nd artifact.  Findings suspicious for aspiration pneumonitis of both lower lobes and right middle lob
e.  Scattered pulmonary nodules within both lungs suspicious for metastatic disease.  Nodular opacity
 within the left suprahilar region may reflect an enlarged lymph node versus suprahilar mass, large l
ymph node within the AP window of the mediastinum suspicious for malignant lymphadenopathy, enlarged 
lymph nodes, left axillary region suspicious for metastatic disease, left adrenal gland mass suspicio
us for metastatic disease.

3.  Brain MRI on 10/03/2018:  Impression:  Limited evaluation due to motion and the patient's inabili
ty to keep complete examination based on images provided.  There is no evidence of brain parenchymal 
metastases, T2 and flair white matter hyperintensities are most compatible with chronic small vessel 
ischemic changes.

4.  On 10/06/2018, chest x-ray:  Impression:  New left subclavian MediPort in good radiograph positio
n.

5.  On 10/06/2018, left subclavian MediPort placement under fluoroscopy.

 

PRIMARY DIAGNOSES:  Acute respiratory failure secondary to chronic obstructive pulmonary disease exac
erbation and mass effect in throat.

 

SECONDARY DIAGNOSES:  Lung cancer, tongue and throat cancer, hypothyroidism, protein-calorie malnutri
tion, hypertension, hypokalemia, hypophosphatemia.

 

DISCHARGE MEDICATIONS:  Losartan/hydrochlorothiazide 100/12.5 mg tab 1 tablet p.o. daily, levothyroxi
ne 137 mcg p.o. daily, amlodipine 10 mg p.o. daily, acetaminophen 650 mg q.4 hours p.r.n., ondansetro
n 4 mg q.6 hours p.r.n., sertraline 50 mg p.o. daily.

 

DISCONTINUED MEDICATIONS:  None.

 

HISTORY OF PRESENT ILLNESS AND HOSPITAL COURSE:  This is a 73-year-old male with past medical history
 of hypothyroidism and esophageal cancer diagnosed and treated in 2003.  Patient presents for history
 of worsening shortness of breath.  The patient was diagnosed with lung cancer 2 weeks before present
ation from a PET scan and reports that he has been having increased dyspnea over the last 2 weeks wit
h productive cough and thick-yellow sputum.  Patient was admitted with acute respiratory failure seco
ndary to mass effect of lung versus COPD exacerbation versus pneumonia and initially started on vanco
mycin and Zosyn, which continued for 1-3 days.  Pulmonology and Oncology were consulted and over the 
course of days based on the patient's clinical picture and imaging, it was deemed by Pulmonology.  Th
e patient was likely not having respiratory failure secondary to infectious causes and likely due to 
the mass effect versus COPD exacerbation.  The patient had been treated with steroids, which were dis
continued on 10/04/2018.  Patient's respiratory status improved over hospitalization with p.r.n. DuoN
ebs and albuterol nebulizations.  During the patient's hospital stay, he also received a port placeme
nt for chemotherapy after receiving 3 rounds of chemotherapy as the patient's respiratory status cont
inued to improve.  He was deemed safe for discharge to home with close follow up with Oncology and wi
th new prescription of DuoNebs.  Hospital stay was further complicated by hypokalemia and hypophospha
temia, which were resolved as they were replaced through the patient's G-tube which had already been 
placed prior to admission.

 

DISPOSITION:  Stable.

 

DISCHARGE INSTRUCTIONS:

1.  Location:  Home.

2.  Diet:  Tube-feeding diet.

3.  Activity:  As tolerated.

4.  Followup:  Follow up with Dr. Betsey Horne in 10 days and with Dr. Yonas Jj on 10/24/2018 at 9:
00 a.m.